# Patient Record
Sex: FEMALE
[De-identification: names, ages, dates, MRNs, and addresses within clinical notes are randomized per-mention and may not be internally consistent; named-entity substitution may affect disease eponyms.]

---

## 2017-02-08 ENCOUNTER — APPOINTMENT (OUTPATIENT)
Dept: WOUND CARE | Facility: CLINIC | Age: 82
End: 2017-02-08

## 2017-02-08 DIAGNOSIS — L02.619 CUTANEOUS ABSCESS OF UNSPECIFIED FOOT: ICD-10-CM

## 2017-02-22 ENCOUNTER — APPOINTMENT (OUTPATIENT)
Dept: WOUND CARE | Facility: CLINIC | Age: 82
End: 2017-02-22

## 2017-04-26 ENCOUNTER — APPOINTMENT (OUTPATIENT)
Dept: WOUND CARE | Facility: CLINIC | Age: 82
End: 2017-04-26

## 2017-07-12 ENCOUNTER — APPOINTMENT (OUTPATIENT)
Dept: WOUND CARE | Facility: CLINIC | Age: 82
End: 2017-07-12

## 2017-07-12 VITALS — DIASTOLIC BLOOD PRESSURE: 91 MMHG | TEMPERATURE: 98.4 F | SYSTOLIC BLOOD PRESSURE: 183 MMHG | HEART RATE: 76 BPM

## 2017-08-30 ENCOUNTER — APPOINTMENT (OUTPATIENT)
Dept: WOUND CARE | Facility: CLINIC | Age: 82
End: 2017-08-30
Payer: MEDICARE

## 2017-08-30 PROCEDURE — 99213 OFFICE O/P EST LOW 20 MIN: CPT

## 2017-10-25 ENCOUNTER — APPOINTMENT (OUTPATIENT)
Dept: WOUND CARE | Facility: CLINIC | Age: 82
End: 2017-10-25
Payer: MEDICARE

## 2017-10-25 DIAGNOSIS — S90.829A BLISTER (NONTHERMAL), UNSPECIFIED FOOT, INITIAL ENCOUNTER: ICD-10-CM

## 2017-10-25 PROCEDURE — 99213 OFFICE O/P EST LOW 20 MIN: CPT

## 2017-11-08 ENCOUNTER — APPOINTMENT (OUTPATIENT)
Dept: WOUND CARE | Facility: CLINIC | Age: 82
End: 2017-11-08
Payer: MEDICARE

## 2017-11-08 VITALS
TEMPERATURE: 97.6 F | RESPIRATION RATE: 16 BRPM | DIASTOLIC BLOOD PRESSURE: 74 MMHG | BODY MASS INDEX: 26.5 KG/M2 | WEIGHT: 135 LBS | SYSTOLIC BLOOD PRESSURE: 178 MMHG | HEART RATE: 77 BPM | HEIGHT: 60 IN

## 2017-11-08 DIAGNOSIS — L97.512 NON-PRESSURE CHRONIC ULCER OF OTHER PART OF RIGHT FOOT WITH FAT LAYER EXPOSED: ICD-10-CM

## 2017-11-08 PROCEDURE — 11042 DBRDMT SUBQ TIS 1ST 20SQCM/<: CPT

## 2017-11-21 ENCOUNTER — APPOINTMENT (OUTPATIENT)
Dept: WOUND CARE | Facility: CLINIC | Age: 82
End: 2017-11-21
Payer: MEDICARE

## 2017-11-21 PROCEDURE — 11042 DBRDMT SUBQ TIS 1ST 20SQCM/<: CPT

## 2018-01-03 ENCOUNTER — APPOINTMENT (OUTPATIENT)
Dept: WOUND CARE | Facility: CLINIC | Age: 83
End: 2018-01-03
Payer: MEDICARE

## 2018-01-03 PROCEDURE — 99213 OFFICE O/P EST LOW 20 MIN: CPT

## 2018-02-28 ENCOUNTER — APPOINTMENT (OUTPATIENT)
Dept: WOUND CARE | Facility: CLINIC | Age: 83
End: 2018-02-28
Payer: MEDICARE

## 2018-02-28 PROCEDURE — 99213 OFFICE O/P EST LOW 20 MIN: CPT

## 2018-04-25 ENCOUNTER — APPOINTMENT (OUTPATIENT)
Dept: WOUND CARE | Facility: CLINIC | Age: 83
End: 2018-04-25
Payer: MEDICARE

## 2018-04-25 DIAGNOSIS — M20.40 OTHER HAMMER TOE(S) (ACQUIRED), UNSPECIFIED FOOT: ICD-10-CM

## 2018-04-25 DIAGNOSIS — L60.0 INGROWING NAIL: ICD-10-CM

## 2018-04-25 PROCEDURE — 99213 OFFICE O/P EST LOW 20 MIN: CPT

## 2021-09-06 ENCOUNTER — INPATIENT (INPATIENT)
Facility: HOSPITAL | Age: 86
LOS: 7 days | Discharge: HOME | DRG: 300 | End: 2021-09-14
Attending: HOSPITALIST | Admitting: STUDENT IN AN ORGANIZED HEALTH CARE EDUCATION/TRAINING PROGRAM
Payer: MEDICARE

## 2021-09-06 VITALS
TEMPERATURE: 98 F | SYSTOLIC BLOOD PRESSURE: 130 MMHG | HEART RATE: 78 BPM | RESPIRATION RATE: 146 BRPM | OXYGEN SATURATION: 98 % | DIASTOLIC BLOOD PRESSURE: 60 MMHG

## 2021-09-06 DIAGNOSIS — D64.9 ANEMIA, UNSPECIFIED: ICD-10-CM

## 2021-09-06 DIAGNOSIS — S91.309A UNSPECIFIED OPEN WOUND, UNSPECIFIED FOOT, INITIAL ENCOUNTER: ICD-10-CM

## 2021-09-06 DIAGNOSIS — E78.5 HYPERLIPIDEMIA, UNSPECIFIED: ICD-10-CM

## 2021-09-06 DIAGNOSIS — L03.116 CELLULITIS OF LEFT LOWER LIMB: ICD-10-CM

## 2021-09-06 DIAGNOSIS — Z96.641 PRESENCE OF RIGHT ARTIFICIAL HIP JOINT: Chronic | ICD-10-CM

## 2021-09-06 DIAGNOSIS — R73.9 HYPERGLYCEMIA, UNSPECIFIED: ICD-10-CM

## 2021-09-06 DIAGNOSIS — I25.10 ATHEROSCLEROTIC HEART DISEASE OF NATIVE CORONARY ARTERY WITHOUT ANGINA PECTORIS: ICD-10-CM

## 2021-09-06 DIAGNOSIS — N17.9 ACUTE KIDNEY FAILURE, UNSPECIFIED: ICD-10-CM

## 2021-09-06 DIAGNOSIS — Z98.49 CATARACT EXTRACTION STATUS, UNSPECIFIED EYE: Chronic | ICD-10-CM

## 2021-09-06 DIAGNOSIS — Z29.9 ENCOUNTER FOR PROPHYLACTIC MEASURES, UNSPECIFIED: ICD-10-CM

## 2021-09-06 DIAGNOSIS — I73.9 PERIPHERAL VASCULAR DISEASE, UNSPECIFIED: ICD-10-CM

## 2021-09-06 DIAGNOSIS — E03.9 HYPOTHYROIDISM, UNSPECIFIED: ICD-10-CM

## 2021-09-06 DIAGNOSIS — Z98.890 OTHER SPECIFIED POSTPROCEDURAL STATES: Chronic | ICD-10-CM

## 2021-09-06 DIAGNOSIS — I10 ESSENTIAL (PRIMARY) HYPERTENSION: ICD-10-CM

## 2021-09-06 LAB
ALBUMIN SERPL ELPH-MCNC: 2.6 G/DL — LOW (ref 3.3–5)
ALP SERPL-CCNC: 92 U/L — SIGNIFICANT CHANGE UP (ref 40–120)
ALT FLD-CCNC: 16 U/L — SIGNIFICANT CHANGE UP (ref 12–78)
ANION GAP SERPL CALC-SCNC: 6 MMOL/L — SIGNIFICANT CHANGE UP (ref 5–17)
AST SERPL-CCNC: 14 U/L — LOW (ref 15–37)
BASOPHILS # BLD AUTO: 0.02 K/UL — SIGNIFICANT CHANGE UP (ref 0–0.2)
BASOPHILS NFR BLD AUTO: 0.2 % — SIGNIFICANT CHANGE UP (ref 0–2)
BILIRUB SERPL-MCNC: 0.2 MG/DL — SIGNIFICANT CHANGE UP (ref 0.2–1.2)
BUN SERPL-MCNC: 54 MG/DL — HIGH (ref 7–23)
CALCIUM SERPL-MCNC: 8.5 MG/DL — SIGNIFICANT CHANGE UP (ref 8.5–10.1)
CHLORIDE SERPL-SCNC: 109 MMOL/L — HIGH (ref 96–108)
CO2 SERPL-SCNC: 28 MMOL/L — SIGNIFICANT CHANGE UP (ref 22–31)
CREAT SERPL-MCNC: 1.6 MG/DL — HIGH (ref 0.5–1.3)
EOSINOPHIL # BLD AUTO: 0.25 K/UL — SIGNIFICANT CHANGE UP (ref 0–0.5)
EOSINOPHIL NFR BLD AUTO: 2.3 % — SIGNIFICANT CHANGE UP (ref 0–6)
ERYTHROCYTE [SEDIMENTATION RATE] IN BLOOD: 53 MM/HR — HIGH (ref 0–20)
GLUCOSE SERPL-MCNC: 123 MG/DL — HIGH (ref 70–99)
HCT VFR BLD CALC: 31.6 % — LOW (ref 34.5–45)
HGB BLD-MCNC: 10.1 G/DL — LOW (ref 11.5–15.5)
IMM GRANULOCYTES NFR BLD AUTO: 0.5 % — SIGNIFICANT CHANGE UP (ref 0–1.5)
LACTATE SERPL-SCNC: 1.3 MMOL/L — SIGNIFICANT CHANGE UP (ref 0.7–2)
LYMPHOCYTES # BLD AUTO: 0.91 K/UL — LOW (ref 1–3.3)
LYMPHOCYTES # BLD AUTO: 8.2 % — LOW (ref 13–44)
MCHC RBC-ENTMCNC: 28.6 PG — SIGNIFICANT CHANGE UP (ref 27–34)
MCHC RBC-ENTMCNC: 32 GM/DL — SIGNIFICANT CHANGE UP (ref 32–36)
MCV RBC AUTO: 89.5 FL — SIGNIFICANT CHANGE UP (ref 80–100)
MONOCYTES # BLD AUTO: 0.84 K/UL — SIGNIFICANT CHANGE UP (ref 0–0.9)
MONOCYTES NFR BLD AUTO: 7.6 % — SIGNIFICANT CHANGE UP (ref 2–14)
NEUTROPHILS # BLD AUTO: 9 K/UL — HIGH (ref 1.8–7.4)
NEUTROPHILS NFR BLD AUTO: 81.2 % — HIGH (ref 43–77)
NRBC # BLD: 0 /100 WBCS — SIGNIFICANT CHANGE UP (ref 0–0)
PLATELET # BLD AUTO: 261 K/UL — SIGNIFICANT CHANGE UP (ref 150–400)
POTASSIUM SERPL-MCNC: 4.5 MMOL/L — SIGNIFICANT CHANGE UP (ref 3.5–5.3)
POTASSIUM SERPL-SCNC: 4.5 MMOL/L — SIGNIFICANT CHANGE UP (ref 3.5–5.3)
PROT SERPL-MCNC: 6.8 G/DL — SIGNIFICANT CHANGE UP (ref 6–8.3)
RBC # BLD: 3.53 M/UL — LOW (ref 3.8–5.2)
RBC # FLD: 16.5 % — HIGH (ref 10.3–14.5)
SARS-COV-2 RNA SPEC QL NAA+PROBE: SIGNIFICANT CHANGE UP
SODIUM SERPL-SCNC: 143 MMOL/L — SIGNIFICANT CHANGE UP (ref 135–145)
WBC # BLD: 11.07 K/UL — HIGH (ref 3.8–10.5)
WBC # FLD AUTO: 11.07 K/UL — HIGH (ref 3.8–10.5)

## 2021-09-06 PROCEDURE — 71045 X-RAY EXAM CHEST 1 VIEW: CPT | Mod: 26

## 2021-09-06 PROCEDURE — 73630 X-RAY EXAM OF FOOT: CPT | Mod: 26,LT

## 2021-09-06 PROCEDURE — 99285 EMERGENCY DEPT VISIT HI MDM: CPT

## 2021-09-06 PROCEDURE — 93010 ELECTROCARDIOGRAM REPORT: CPT

## 2021-09-06 PROCEDURE — 99223 1ST HOSP IP/OBS HIGH 75: CPT | Mod: GC

## 2021-09-06 RX ORDER — LOSARTAN POTASSIUM 100 MG/1
100 TABLET, FILM COATED ORAL DAILY
Refills: 0 | Status: DISCONTINUED | OUTPATIENT
Start: 2021-09-06 | End: 2021-09-06

## 2021-09-06 RX ORDER — INFLUENZA VIRUS VACCINE 15; 15; 15; 15 UG/.5ML; UG/.5ML; UG/.5ML; UG/.5ML
0.5 SUSPENSION INTRAMUSCULAR ONCE
Refills: 0 | Status: COMPLETED | OUTPATIENT
Start: 2021-09-06 | End: 2021-09-06

## 2021-09-06 RX ORDER — FONDAPARINUX SODIUM 2.5 MG/.5ML
2.5 INJECTION, SOLUTION SUBCUTANEOUS DAILY
Refills: 0 | Status: DISCONTINUED | OUTPATIENT
Start: 2021-09-06 | End: 2021-09-07

## 2021-09-06 RX ORDER — LEVOTHYROXINE SODIUM 125 MCG
25 TABLET ORAL DAILY
Refills: 0 | Status: DISCONTINUED | OUTPATIENT
Start: 2021-09-06 | End: 2021-09-14

## 2021-09-06 RX ORDER — SODIUM CHLORIDE 9 MG/ML
1000 INJECTION INTRAMUSCULAR; INTRAVENOUS; SUBCUTANEOUS ONCE
Refills: 0 | Status: COMPLETED | OUTPATIENT
Start: 2021-09-06 | End: 2021-09-06

## 2021-09-06 RX ORDER — PIPERACILLIN AND TAZOBACTAM 4; .5 G/20ML; G/20ML
3.38 INJECTION, POWDER, LYOPHILIZED, FOR SOLUTION INTRAVENOUS ONCE
Refills: 0 | Status: COMPLETED | OUTPATIENT
Start: 2021-09-06 | End: 2021-09-06

## 2021-09-06 RX ORDER — SIMVASTATIN 20 MG/1
40 TABLET, FILM COATED ORAL AT BEDTIME
Refills: 0 | Status: DISCONTINUED | OUTPATIENT
Start: 2021-09-06 | End: 2021-09-14

## 2021-09-06 RX ORDER — LANOLIN ALCOHOL/MO/W.PET/CERES
3 CREAM (GRAM) TOPICAL AT BEDTIME
Refills: 0 | Status: DISCONTINUED | OUTPATIENT
Start: 2021-09-06 | End: 2021-09-14

## 2021-09-06 RX ORDER — ASPIRIN/CALCIUM CARB/MAGNESIUM 324 MG
81 TABLET ORAL DAILY
Refills: 0 | Status: DISCONTINUED | OUTPATIENT
Start: 2021-09-06 | End: 2021-09-14

## 2021-09-06 RX ORDER — CLOPIDOGREL BISULFATE 75 MG/1
75 TABLET, FILM COATED ORAL DAILY
Refills: 0 | Status: DISCONTINUED | OUTPATIENT
Start: 2021-09-06 | End: 2021-09-14

## 2021-09-06 RX ORDER — ACETAMINOPHEN 500 MG
650 TABLET ORAL EVERY 6 HOURS
Refills: 0 | Status: DISCONTINUED | OUTPATIENT
Start: 2021-09-06 | End: 2021-09-14

## 2021-09-06 RX ORDER — SENNA PLUS 8.6 MG/1
2 TABLET ORAL AT BEDTIME
Refills: 0 | Status: DISCONTINUED | OUTPATIENT
Start: 2021-09-06 | End: 2021-09-14

## 2021-09-06 RX ORDER — ATENOLOL 25 MG/1
100 TABLET ORAL DAILY
Refills: 0 | Status: DISCONTINUED | OUTPATIENT
Start: 2021-09-06 | End: 2021-09-14

## 2021-09-06 RX ORDER — PIPERACILLIN AND TAZOBACTAM 4; .5 G/20ML; G/20ML
3.38 INJECTION, POWDER, LYOPHILIZED, FOR SOLUTION INTRAVENOUS EVERY 8 HOURS
Refills: 0 | Status: DISCONTINUED | OUTPATIENT
Start: 2021-09-06 | End: 2021-09-09

## 2021-09-06 RX ADMIN — Medication 650 MILLIGRAM(S): at 20:51

## 2021-09-06 RX ADMIN — Medication 650 MILLIGRAM(S): at 20:21

## 2021-09-06 RX ADMIN — SODIUM CHLORIDE 1000 MILLILITER(S): 9 INJECTION INTRAMUSCULAR; INTRAVENOUS; SUBCUTANEOUS at 15:49

## 2021-09-06 RX ADMIN — SIMVASTATIN 40 MILLIGRAM(S): 20 TABLET, FILM COATED ORAL at 22:28

## 2021-09-06 RX ADMIN — PIPERACILLIN AND TAZOBACTAM 200 GRAM(S): 4; .5 INJECTION, POWDER, LYOPHILIZED, FOR SOLUTION INTRAVENOUS at 15:12

## 2021-09-06 RX ADMIN — PIPERACILLIN AND TAZOBACTAM 25 GRAM(S): 4; .5 INJECTION, POWDER, LYOPHILIZED, FOR SOLUTION INTRAVENOUS at 22:28

## 2021-09-06 NOTE — H&P ADULT - PROBLEM SELECTOR PLAN 7
- pt hyperglycemic in ED (glucose 123) without documented history of diabetes mellitus  - will check AM A1c - c/w home simvastatin 40mg

## 2021-09-06 NOTE — H&P ADULT - NSHPPHYSICALEXAM_GEN_ALL_CORE
T(C): 36.7 (09-06-21 @ 19:32), Max: 36.9 (09-06-21 @ 17:31)  HR: 86 (09-06-21 @ 19:32) (78 - 86)  BP: 149/81 (09-06-21 @ 19:32) (130/60 - 149/81)  RR: 18 (09-06-21 @ 19:32) (18 - 146)  SpO2: 95% (09-06-21 @ 19:32) (95% - 98%)    General: Elderly female in no acute distress, resting comfortably, well nourished  HEENT: NCAT, PERRLA, EOMI bl, moist mucous membranes   Neck: Supple, nontender, no mass  Neurology: A&Ox3, nonfocal, CN II-XII grossly intact, sensation intact  Respiratory: CTA B/L, No W/R/R  CV: RRR, +S1/S2, no murmurs, rubs or gallops  Abdominal: Soft, NT, ND +BSx4  Extremities: RLE with intact skin, Left lower extremity with scaly/crusted appearance, with erythematous dorsal foot and webspaces with broken skin, with purulent fluid in webspaces, mildly tender to palpation.  MSK: Normal ROM, no joint erythema or warmth, no joint swelling   Skin: warm, dry, normal color for race T(C): 36.7 (09-06-21 @ 19:32), Max: 36.9 (09-06-21 @ 17:31)  HR: 86 (09-06-21 @ 19:32) (78 - 86)  BP: 149/81 (09-06-21 @ 19:32) (130/60 - 149/81)  RR: 18 (09-06-21 @ 19:32) (18 - 146)  SpO2: 95% (09-06-21 @ 19:32) (95% - 98%)    General: Elderly female in no acute distress, resting comfortably, well nourished  HEENT: NCAT, PERRLA, EOMI bl, moist mucous membranes   Neck: Supple, nontender, no mass  Neurology: A&Ox3, nonfocal, CN II-XII grossly intact, sensation intact  Respiratory: CTA B/L, No W/R/R  CV: RRR, +S1/S2, no murmurs, rubs or gallops  Abdominal: Soft, NT, ND +BSx4  Extremities: RLE with intact skin, Left lower extremity with scaly/crusted appearance, with erythematous dorsal foot and webspaces with broken skin, with purulent fluid in webspaces, mildly tender to palpation. Bilateral lower extremities warm with bilateral DP and PT pulses difficult to appreciate.  MSK: Normal ROM, no joint erythema or warmth, no joint swelling   Skin: warm, dry, normal color for race T(C): 36.7 (09-06-21 @ 19:32), Max: 36.9 (09-06-21 @ 17:31)  HR: 86 (09-06-21 @ 19:32) (78 - 86)  BP: 149/81 (09-06-21 @ 19:32) (130/60 - 149/81)  RR: 18 (09-06-21 @ 19:32) (18 - 146)  SpO2: 95% (09-06-21 @ 19:32) (95% - 98%)    General: Elderly female in no acute distress, resting comfortably, well nourished  HEENT: NCAT, PERRLA, EOMI bl, moist mucous membranes   Neck: Supple, nontender, no mass  Neurology: A&Ox3, nonfocal, CN II-XII grossly intact, sensation intact  Respiratory: CTA B/L, No W/R/R  CV: RRR, +S1/S2, no murmurs, rubs or gallops  Abdominal: Soft, NT, ND +BSx4  Extremities: RLE with intact skin, Left lower extremity with scaly/crusted appearance, with erythematous dorsal foot and webspaces with broken skin, with purulent fluid in webspaces, mildly tender to palpation. Bilateral lower extremities warm with bilateral DP and PT pulses difficult to appreciate.  MSK: Normal ROM, no joint erythema or warmth, no joint swelling   Skin: warm, dry

## 2021-09-06 NOTE — ED ADULT NURSE NOTE - OBJECTIVE STATEMENT
Patient is a 89yo female sent from skilled nursing facility for wound check on worsening right foot wound  Pt A/OX, resting in stretcher comfortably. All VSS. No C/O CP/SOB/HA/N/V/D. Safety precautions maintained. No distress noted at this time. Will cont. to monitor. Patient is a 89yo female sent from skilled nursing facility for wound check on worsening left foot wound  Pt A/OX, resting in stretcher comfortably. All VSS. No C/O CP/SOB/HA/N/V/D. Safety precautions maintained. No distress noted at this time. Will cont. to monitor.

## 2021-09-06 NOTE — H&P ADULT - ATTENDING COMMENTS
89F with PMHx CAD, HTN, HLD, hypothyroidism, chronic foot wounds, from [facility], sent in for L foot wound that has failed outpatient antibiotic therapy, admitting for IV abx treatment of left foot cellulitis.    admit to medicine  continue IV zosyn for now  patient reports drainage and that her socks get soaked  check MRSA pcr of wound and culture  follow up blood cultures, monitor for fevers  MARIANA Gonzalez consulted for antibiotic guidance  Vascular Reilly consulted to evaluate PAD and poor pulses  podiatry consulted (Norm), d/w podiatry - to eval in AM  MOLST states DNR but patient denies this, and is indecisive - will f/u PC  d/w patient extensively    Agree with H&P as outlined above, edited where appropriate.

## 2021-09-06 NOTE — H&P ADULT - PROBLEM SELECTOR PLAN 1
- pt with history of foot wounds, past R heel wound required skin graft  - pt has had increased redness and "oozing" per wound care nurse at [facility], pt started on outpt course of augmentin (9/3) with worsening wound appearance  - received zosyn 3.375g in ED  - c/w Zosyn 3.375g  - will order ID consult Dr. Gonzaelz, appreciate recs  - will order podiatry consult, appreciate recs  - pt not complaining of pain, have tylenol 650mg PRN for mild pain - pt with history of foot wounds, past R heel wound required skin graft  - pt has had increased redness and "oozing" per wound care nurse at [facility], pt started on outpt course of augmentin (9/3) with worsening wound appearance  - received zosyn 3.375g in ED  - c/w Zosyn 3.375g  - will order ID consult Dr. Gonzalez, appreciate recs  - will order podiatry consult, appreciate recs  - will order vascular consult, appreciate recs  - pt not complaining of pain, have tylenol 650mg PRN for mild pain - pt with history of foot wounds, past R heel wound required skin graft  - pt has had increased redness and "oozing" per wound care nurse at [facility], pt started on outpt course of augmentin (9/3) with worsening wound appearance  - received zosyn 3.375g in ED - given outpatient choice not to cover MRSA - may have some data from OP podiatry  - c/w Zosyn 3.375g q8 for now - check mrsa pcr of wound and culture  - follow up blood cultures  - ID consult Dr. Gonzalez  - d/w podiatry (Dr. Zheng), to see in AM  - vascular consult Dr. Grover  - pt not complaining of pain, have tylenol 650mg PRN for mild pain

## 2021-09-06 NOTE — ED ADULT NURSE NOTE - NSIMPLEMENTINTERV_GEN_ALL_ED
Implemented All Fall with Harm Risk Interventions:  [place] to call system. Call bell, personal items and telephone within reach. Instruct patient to call for assistance. Room bathroom lighting operational. Non-slip footwear when patient is off stretcher. Physically safe environment: no spills, clutter or unnecessary equipment. Stretcher in lowest position, wheels locked, appropriate side rails in place. Provide visual cue, wrist band, yellow gown, etc. Monitor gait and stability. Monitor for mental status changes and reorient to person, place, and time. Review medications for side effects contributing to fall risk. Reinforce activity limits and safety measures with patient and family. Provide visual clues: red socks.

## 2021-09-06 NOTE — H&P ADULT - PROBLEM/PLAN-8
I spoke with patient's mom and let her know I reached out to Dr. Mathis to see if she heard back from insurance company after the manual review was sent in. Lu has decided to cancel tomorrows appointment until she knows what the insurance has to say.  
Per Dr. Mathis vision therapy is covered and [place] took care of notifying  the insurance company. I called Frantz's mom and she has decided to keep tomorrows appointment.   
DISPLAY PLAN FREE TEXT

## 2021-09-06 NOTE — H&P ADULT - NSHPOUTPATIENTPROVIDERS_GEN_ALL_CORE
PCP - Dr. Cervantes PCP - Dr. Cervantes  Podiatry - Dr. Smith PCP - Dr. Cervantes ([facility])  Podiatry - Dr. Smith

## 2021-09-06 NOTE — H&P ADULT - PROBLEM SELECTOR PLAN 9
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

## 2021-09-06 NOTE — ED PROVIDER NOTE - OBJECTIVE STATEMENT
89 y/o F from [facility] living sent in for worsening left foot infection on PO antibiotics x 3 days.  pt with chronic wounds and follows with Podiatry outpt.  Daughter at bedside states left foot started "oozing" 3 days ago and was placed on oral antibiotics.  Today during a dressing change by wound care nurse, wound/foot appeared worse.  pt denies fevers.

## 2021-09-06 NOTE — H&P ADULT - PROBLEM SELECTOR PLAN 10
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 PC follow up in AM.

## 2021-09-06 NOTE — H&P ADULT - NSHPLABSRESULTS_GEN_ALL_CORE
10.1   11.07 )-----------( 261      ( 06 Sep 2021 15:10 )             31.6     06 Sep 2021 15:10    143    |  109    |  54     ----------------------------<  123    4.5     |  28     |  1.60     Ca    8.5        06 Sep 2021 15:10    TPro  6.8    /  Alb  2.6    /  TBili  0.2    /  DBili  x      /  AST  14     /  ALT  16     /  AlkPhos  92     06 Sep 2021 15:10    LIVER FUNCTIONS - ( 06 Sep 2021 15:10 )  Alb: 2.6 g/dL / Pro: 6.8 g/dL / ALK PHOS: 92 U/L / ALT: 16 U/L / AST: 14 U/L / GGT: x             CAPILLARY BLOOD GLUCOSE .    < from: Xray Foot AP + Lateral + Oblique, Left (09.06.21 @ 14:48) >      EXAM:  XR FOOT COMP MIN 3 VIEWS LT                            PROCEDURE DATE:  09/06/2021          INTERPRETATION:  Ulcer left foot.    3 views left foot.    Advanced diffuse senile demineralization. Multiple hammertoe deformities. No fracture dislocation focal bone lysis or unusual periosteal reaction. Faint small vessel calcification. No soft tissue gas. Mild diffuse soft tissue edema    IMPRESSION: No acute or destructive osseous pathology. No plain film evidence of osteomyelitis.    --- Endof Report ---      CONNOR CEDILLO MD; Attending Radiologist  This document has been electronically signed. Sep  6 2021  2:54PM    < end of copied text >    Labs, Imaging and EKG all personally reviewed by the attending physician.

## 2021-09-06 NOTE — H&P ADULT - HISTORY OF PRESENT ILLNESS
This patient is a 89y Female, from [facility], with PMHx of HTN, HLD, CAD, CHF, hypothyroidism, chronic wounds, presenting with L foot wound. She has a history of chronic wounds and has been following with podiatry outpatient. She was recently started on augmentin on 9/3 x3d, with worsening wound appearance per wound care nurse.     In the ED,  VS: T 98.3, HR 78, /60, RR 14, SpO2 98% on RA  Labs: WBC 11.07, H/H 10.1/31.6, BUN 54, Cr 1.60, glucose 123  Imaging: XR L foot No acute or destructive osseous pathology. No plain film evidence of osteomyelitis.  EKG: _____________  Received: NS bolus 1L x1, zosyn 3.375g x1    COVID vaccine:  PCP:  Specialists:  Pharmacy: This patient is a 89y Female, from [facility], with PMHx of HTN, HLD, CAD, hypothyroidism, chronic wounds, presenting with L foot wound. She has a history of chronic wounds and has been following with podiatry outpatient (Dr. Smith). She was recently started on Augmentin on 9/3 x3d, with worsening wound appearance per wound care nurse. She is accompanied by her daughter Fer who contributes to history. She has been treated in the past for foot/heel wounds, but recently over the last 2-3 weeks has noticed redness between her toes that has progressed to cracking skin and "oozing"/"weeping," per the wound care nurse at [place]. 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 Since then, she has had "circulation" issues with her left leg but has minimal history of hospitalization, most recently for a fall in October 2019 for which she fractured her R hip, admitted at [facility], with no surgical intervention due to her age. She has been non-ambulatory in a wheelchair since then. She states her foot only hurts when somebody touches it and denies other pain, fever, chills, chest pain, cough, SOB, nausea, vomiting, abdominal pain. Unvaccinated for COVID but reports having COVID at [facility] in Dec 2020, received monoclonal antibody treatment per daughter, was not sent to hospital and oxygen levels were "good."     In the ED,  VS: T 98.3, HR 78, /60, RR 14, SpO2 98% on RA  Labs: WBC 11.07, H/H 10.1/31.6, BUN 54, Cr 1.60, glucose 123, ESR 53,   Imaging: XR L foot No acute or destructive osseous pathology. No plain film evidence of osteomyelitis. CXR pending read  EKG: sinus rhythm with 1st degree AV block, CO interval 344 ms, left axis deviation  Received: NS bolus 1L x1, zosyn 3.375g x1    COVID vaccine: none  PCP: Dr. Sarwat Cervantes  Specialists: Podiatry Dr. Smith This patient is a 89y Female, from [facility], with PMHx of HTN, HLD, CAD, PAD, hypothyroidism, chronic wounds, presenting with L foot wound. She has a history of chronic wounds and has been following with podiatry outpatient (Dr. Smith). She was recently started on Augmentin on 9/3 x3d, with worsening wound appearance per wound care nurse. She is accompanied by her daughter Fer who contributes to history. She has been treated in the past for foot/heel wounds, but recently over the last 2-3 weeks has noticed redness between her toes that has progressed to cracking skin and "oozing"/"weeping," per the wound care nurse at [place]. 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 Since then, she has had "circulation" issues with her left leg but has minimal history of hospitalization, most recently for a fall in October 2019 for which she fractured her R hip, admitted at [facility], with no surgical intervention due to her age. She has been non-ambulatory in a wheelchair since then. She states her foot only hurts when somebody touches it and denies other pain, fever, chills, chest pain, cough, SOB, nausea, vomiting, abdominal pain. Unvaccinated for COVID but reports having COVID at [facility] in Dec 2020, received monoclonal antibody treatment per daughter, was not sent to hospital and oxygen levels were "good." Patient otherwise feels well but notes that her socks get soaked and believes the area is draining.    In the ED,  VS: T 98.3, HR 78, /60, RR 14, SpO2 98% on RA  Labs: WBC 11.07, H/H 10.1/31.6, BUN 54, Cr 1.60, glucose 123, ESR 53,   CXR: rotated right, no acute infiltrates seen on personal read  XR L foot No acute or destructive osseous pathology. No plain film evidence of osteomyelitis.  EKG: sinus rhythm with 1st degree AV block, IA interval 344 ms, left axis deviation  Received: NS bolus 1L x1, zosyn 3.375g x1    COVID vaccine: none  PCP: Dr. Sarwat Cervantes  Specialists: Podiatry Dr. Smith

## 2021-09-06 NOTE — H&P ADULT - NSICDXPASTSURGICALHX_GEN_ALL_CORE_FT
PAST SURGICAL HISTORY:  History of total hip replacement, right ~2000    S/P cataract surgery     S/P LASIK surgery

## 2021-09-06 NOTE — H&P ADULT - PROBLEM SELECTOR PLAN 2
- pt with BUN 54 Cr 1.60, no baseline creatinine in chart  - received NS bolus 1L x1 in ED  - pt appears euvolemic on exam  - holding home Lasix 20mg, reassess AM labs and continue lasix if NIHARIKA improved Patient with poor pulses and hx of PAD/poor circulation  - continue asa, plavix and statin  - vascular consult

## 2021-09-06 NOTE — H&P ADULT - PROBLEM SELECTOR PLAN 3
- pt with H/H 10.1/31.6, MCV 89.5, no documented history of anemia, pt denies bleeding  - will order iron studies, retics, B12, folate, haptoglobin, LDH - pt with BUN 54 Cr 1.60, unknown baseline Cr but was ~1.36 8/2021  - likely mild prerenal azotemia on CKD 3; received NS bolus 1L x1 in ED - monitor renal indices  - pt appears euvolemic on exam  - holding home Lasix 20mg and ARB, reassess AM labs and resume if Cr is stable/toward baseline

## 2021-09-06 NOTE — H&P ADULT - PROBLEM SELECTOR PLAN 4
- c/w home ASA 81mg and plavix 75mg  - pt denies history of MI, stent placement - pt with H/H 10.1/31.6, MCV 89.5, no documented history of anemia, pt denies bleeding  - will order iron studies, retics, B12, folate, haptoglobin, LDH

## 2021-09-06 NOTE — H&P ADULT - NSHPSOCIALHISTORY_GEN_ALL_CORE
Former smoker, "didn't inhale", from ages [age]    Denies EtOH  Denies recreational drugs    Non-ambulatory, uses wheelchair  Requires assistance with ADLs from skilled nursing

## 2021-09-06 NOTE — H&P ADULT - PROBLEM SELECTOR PLAN 6
- c/w home simvastatin 40mg - on losartan 100mg and atenolol 100mg at home  - will continue atenolol with hold parameters  - will hold losartan until renal indices improve  - monitor renal indices

## 2021-09-06 NOTE — ED ADULT NURSE REASSESSMENT NOTE - NS ED NURSE REASSESS COMMENT FT1
Received patient resting in stretcher. A/Ox4. Daughter at bedside. Patient c/o left foot pain at this time. Erythema, swelling and wounds noted 2/2 cellulitis. IVF infusing per physician order. External catheter placed on patient. Call light in reach. NAD noted at this time. Received patient resting in stretcher. A/Ox4. Daughter at bedside. Patient c/o left foot pain at this time. Erythema, swelling and wounds noted 2/2 chronic wounds that became cellulitic. IVF infusing per physician order. External catheter placed on patient. Call light in reach. NAD noted at this time.

## 2021-09-06 NOTE — ED ADULT NURSE NOTE - CHPI ED NUR SEVERITY2
Patient stated he was supposed to get a prescription for Medrol Dose Pack, not at his pharmacy as of yet PAIN SCALE 4 OF 10.

## 2021-09-06 NOTE — ED PROVIDER NOTE - CLINICAL SUMMARY MEDICAL DECISION MAKING FREE TEXT BOX
left foot infection/wound.  Failed outpt oral antibiotics.  septic workup, r/o Osteo. IV antibiotics, wounds care

## 2021-09-06 NOTE — H&P ADULT - NSICDXPASTMEDICALHX_GEN_ALL_CORE_FT
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

## 2021-09-06 NOTE — H&P ADULT - NSHPREVIEWOFSYSTEMS_GEN_ALL_CORE
CONSTITUTIONAL: denies fever, chills, fatigue, weakness  HEENT: denies blurred vision, sore throat, +nasal congestion (chronic)  SKIN: denies new lesions, rash  CARDIOVASCULAR: denies chest pain, chest pressure, palpitations  RESPIRATORY: denies shortness of breath, sputum production  GASTROINTESTINAL: denies nausea, vomiting, diarrhea, abdominal pain  GENITOURINARY: denies dysuria, discharge  NEUROLOGICAL: denies numbness, headache, focal weakness  MUSCULOSKELETAL: denies new joint pain, muscle aches  HEMATOLOGIC: denies gross bleeding, bruising  LYMPHATICS: denies enlarged lymph nodes, extremity swelling  PSYCHIATRIC: denies recent changes in anxiety, depression  ENDOCRINOLOGIC: denies sweating, +cold intolerance, no heat intolerance CONSTITUTIONAL: denies fever, chills, fatigue, weakness  HEENT: denies blurred vision, sore throat, +nasal congestion (chronic), +[place]  SKIN: denies new lesions, rash  CARDIOVASCULAR: denies chest pain, chest pressure, palpitations  RESPIRATORY: denies shortness of breath, sputum production  GASTROINTESTINAL: denies nausea, vomiting, diarrhea, abdominal pain  GENITOURINARY: denies dysuria, discharge  NEUROLOGICAL: denies numbness, headache, focal weakness  MUSCULOSKELETAL: denies new joint pain, muscle aches  HEMATOLOGIC: denies gross bleeding, bruising  LYMPHATICS: denies enlarged lymph nodes, extremity swelling  PSYCHIATRIC: denies recent changes in anxiety, depression  ENDOCRINOLOGIC: denies sweating, +cold intolerance, no heat intolerance

## 2021-09-06 NOTE — H&P ADULT - ASSESSMENT
89F with PMHx CAD, HTN, HLD, ?CHF, hypothyroidism, chronic wounds, from [facility], sent in for L foot wound that has failed outpatient antibiotic therapy, admitting for IV abx treatment of L foot cellulitis. 89F with PMHx CAD, HTN, HLD, hypothyroidism, chronic foot wounds, from [facility], sent in for L foot wound that has failed outpatient antibiotic therapy, admitting for IV abx treatment of L foot cellulitis. 89F with PMHx CAD, HTN, HLD, hypothyroidism, chronic foot wounds, from [facility], sent in for L foot wound that has failed outpatient antibiotic therapy, admitting for IV abx treatment of left foot cellulitis.

## 2021-09-06 NOTE — H&P ADULT - PROBLEM SELECTOR PLAN 5
- c/w home meds (losartan 100mg and atenolol 100mg) with hold parameters - c/w home ASA 81mg, plavix 75mg and simvastatin  - pt denies history of MI, stent placement

## 2021-09-06 NOTE — H&P ADULT - PROBLEM SELECTOR PLAN 8
- c/w home synthroid 25mcg - pt hyperglycemic in ED (glucose 123) without documented history of diabetes mellitus  - will check AM A1c

## 2021-09-07 ENCOUNTER — TRANSCRIPTION ENCOUNTER (OUTPATIENT)
Age: 86
End: 2021-09-07

## 2021-09-07 LAB
A1C WITH ESTIMATED AVERAGE GLUCOSE RESULT: 5.7 % — HIGH (ref 4–5.6)
ALBUMIN SERPL ELPH-MCNC: 2.2 G/DL — LOW (ref 3.3–5)
ALP SERPL-CCNC: 66 U/L — SIGNIFICANT CHANGE UP (ref 40–120)
ALT FLD-CCNC: 13 U/L — SIGNIFICANT CHANGE UP (ref 12–78)
ANION GAP SERPL CALC-SCNC: 6 MMOL/L — SIGNIFICANT CHANGE UP (ref 5–17)
AST SERPL-CCNC: 14 U/L — LOW (ref 15–37)
BASOPHILS # BLD AUTO: 0.04 K/UL — SIGNIFICANT CHANGE UP (ref 0–0.2)
BASOPHILS NFR BLD AUTO: 0.3 % — SIGNIFICANT CHANGE UP (ref 0–2)
BILIRUB SERPL-MCNC: 0.4 MG/DL — SIGNIFICANT CHANGE UP (ref 0.2–1.2)
BUN SERPL-MCNC: 43 MG/DL — HIGH (ref 7–23)
CALCIUM SERPL-MCNC: 8.3 MG/DL — LOW (ref 8.5–10.1)
CHLORIDE SERPL-SCNC: 112 MMOL/L — HIGH (ref 96–108)
CO2 SERPL-SCNC: 25 MMOL/L — SIGNIFICANT CHANGE UP (ref 22–31)
COVID-19 SPIKE DOMAIN AB INTERP: POSITIVE
COVID-19 SPIKE DOMAIN ANTIBODY RESULT: >250 U/ML — HIGH
CREAT SERPL-MCNC: 1.2 MG/DL — SIGNIFICANT CHANGE UP (ref 0.5–1.3)
CRP SERPL-MCNC: 21 MG/L — HIGH
EOSINOPHIL # BLD AUTO: 0.14 K/UL — SIGNIFICANT CHANGE UP (ref 0–0.5)
EOSINOPHIL NFR BLD AUTO: 1.2 % — SIGNIFICANT CHANGE UP (ref 0–6)
ESTIMATED AVERAGE GLUCOSE: 117 MG/DL — HIGH (ref 68–114)
FERRITIN SERPL-MCNC: 82 NG/ML — SIGNIFICANT CHANGE UP (ref 15–150)
FOLATE SERPL-MCNC: >20 NG/ML — SIGNIFICANT CHANGE UP
GLUCOSE SERPL-MCNC: 77 MG/DL — SIGNIFICANT CHANGE UP (ref 70–99)
HAPTOGLOB SERPL-MCNC: 236 MG/DL — HIGH (ref 34–200)
HCT VFR BLD CALC: 28.9 % — LOW (ref 34.5–45)
HGB BLD-MCNC: 9.2 G/DL — LOW (ref 11.5–15.5)
IMM GRANULOCYTES NFR BLD AUTO: 0.5 % — SIGNIFICANT CHANGE UP (ref 0–1.5)
IRON SATN MFR SERPL: 11 % — LOW (ref 14–50)
IRON SATN MFR SERPL: 26 UG/DL — LOW (ref 30–160)
LDH SERPL L TO P-CCNC: 244 U/L — HIGH (ref 50–242)
LYMPHOCYTES # BLD AUTO: 0.78 K/UL — LOW (ref 1–3.3)
LYMPHOCYTES # BLD AUTO: 6.7 % — LOW (ref 13–44)
MAGNESIUM SERPL-MCNC: 2.3 MG/DL — SIGNIFICANT CHANGE UP (ref 1.6–2.6)
MCHC RBC-ENTMCNC: 28.2 PG — SIGNIFICANT CHANGE UP (ref 27–34)
MCHC RBC-ENTMCNC: 31.8 GM/DL — LOW (ref 32–36)
MCV RBC AUTO: 88.7 FL — SIGNIFICANT CHANGE UP (ref 80–100)
MONOCYTES # BLD AUTO: 0.75 K/UL — SIGNIFICANT CHANGE UP (ref 0–0.9)
MONOCYTES NFR BLD AUTO: 6.4 % — SIGNIFICANT CHANGE UP (ref 2–14)
MRSA PCR RESULT.: SIGNIFICANT CHANGE UP
NEUTROPHILS # BLD AUTO: 9.95 K/UL — HIGH (ref 1.8–7.4)
NEUTROPHILS NFR BLD AUTO: 84.9 % — HIGH (ref 43–77)
NRBC # BLD: 0 /100 WBCS — SIGNIFICANT CHANGE UP (ref 0–0)
PHOSPHATE SERPL-MCNC: 3 MG/DL — SIGNIFICANT CHANGE UP (ref 2.5–4.5)
PLATELET # BLD AUTO: 232 K/UL — SIGNIFICANT CHANGE UP (ref 150–400)
POTASSIUM SERPL-MCNC: 4.4 MMOL/L — SIGNIFICANT CHANGE UP (ref 3.5–5.3)
POTASSIUM SERPL-SCNC: 4.4 MMOL/L — SIGNIFICANT CHANGE UP (ref 3.5–5.3)
PROT SERPL-MCNC: 6.1 G/DL — SIGNIFICANT CHANGE UP (ref 6–8.3)
RBC # BLD: 3.26 M/UL — LOW (ref 3.8–5.2)
RBC # BLD: 3.26 M/UL — LOW (ref 3.8–5.2)
RBC # FLD: 16.2 % — HIGH (ref 10.3–14.5)
RETICS #: 28.7 K/UL — SIGNIFICANT CHANGE UP (ref 25–125)
RETICS/RBC NFR: 0.9 % — SIGNIFICANT CHANGE UP (ref 0.5–2.5)
S AUREUS DNA NOSE QL NAA+PROBE: SIGNIFICANT CHANGE UP
SARS-COV-2 IGG+IGM SERPL QL IA: >250 U/ML — HIGH
SARS-COV-2 IGG+IGM SERPL QL IA: POSITIVE
SODIUM SERPL-SCNC: 143 MMOL/L — SIGNIFICANT CHANGE UP (ref 135–145)
TIBC SERPL-MCNC: 247 UG/DL — SIGNIFICANT CHANGE UP (ref 220–430)
UIBC SERPL-MCNC: 221 UG/DL — SIGNIFICANT CHANGE UP (ref 110–370)
VIT B12 SERPL-MCNC: 1688 PG/ML — HIGH (ref 232–1245)
WBC # BLD: 11.72 K/UL — HIGH (ref 3.8–10.5)
WBC # FLD AUTO: 11.72 K/UL — HIGH (ref 3.8–10.5)

## 2021-09-07 PROCEDURE — 99497 ADVNCD CARE PLAN 30 MIN: CPT

## 2021-09-07 PROCEDURE — 99222 1ST HOSP IP/OBS MODERATE 55: CPT

## 2021-09-07 PROCEDURE — 99232 SBSQ HOSP IP/OBS MODERATE 35: CPT | Mod: GC

## 2021-09-07 PROCEDURE — 99221 1ST HOSP IP/OBS SF/LOW 40: CPT

## 2021-09-07 RX ORDER — LOSARTAN POTASSIUM 100 MG/1
100 TABLET, FILM COATED ORAL DAILY
Refills: 0 | Status: DISCONTINUED | OUTPATIENT
Start: 2021-09-07 | End: 2021-09-14

## 2021-09-07 RX ORDER — FUROSEMIDE 40 MG
20 TABLET ORAL DAILY
Refills: 0 | Status: DISCONTINUED | OUTPATIENT
Start: 2021-09-07 | End: 2021-09-14

## 2021-09-07 RX ADMIN — PIPERACILLIN AND TAZOBACTAM 25 GRAM(S): 4; .5 INJECTION, POWDER, LYOPHILIZED, FOR SOLUTION INTRAVENOUS at 21:10

## 2021-09-07 RX ADMIN — SENNA PLUS 2 TABLET(S): 8.6 TABLET ORAL at 21:15

## 2021-09-07 RX ADMIN — ATENOLOL 100 MILLIGRAM(S): 25 TABLET ORAL at 05:46

## 2021-09-07 RX ADMIN — SIMVASTATIN 40 MILLIGRAM(S): 20 TABLET, FILM COATED ORAL at 21:15

## 2021-09-07 RX ADMIN — Medication 25 MICROGRAM(S): at 05:47

## 2021-09-07 RX ADMIN — PIPERACILLIN AND TAZOBACTAM 25 GRAM(S): 4; .5 INJECTION, POWDER, LYOPHILIZED, FOR SOLUTION INTRAVENOUS at 15:30

## 2021-09-07 RX ADMIN — Medication 81 MILLIGRAM(S): at 12:37

## 2021-09-07 RX ADMIN — PIPERACILLIN AND TAZOBACTAM 25 GRAM(S): 4; .5 INJECTION, POWDER, LYOPHILIZED, FOR SOLUTION INTRAVENOUS at 05:46

## 2021-09-07 RX ADMIN — CLOPIDOGREL BISULFATE 75 MILLIGRAM(S): 75 TABLET, FILM COATED ORAL at 12:37

## 2021-09-07 NOTE — CONSULT NOTE ADULT - SUBJECTIVE AND OBJECTIVE BOX
HPI:  89 y/o F admitted at [facility] from [facility], with PMHx of HTN, HLD, CAD, PAD, hypothyroidism, chronic wounds, presenting with L foot wound. She has a history of chronic wounds and has been following with podiatry outpatient (Dr. Smith). She was recently started on Augmentin on 9/3 x3d, with worsening wound appearance per wound care nurse. She is accompanied by her daughter Fer who contributes to history. She has been treated in the past for foot/heel wounds, but recently over the last 2-3 weeks has noticed redness between her toes that has progressed to cracking skin and "oozing"/"weeping," per the wound care nurse at [place]. 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 Since then, she has had "circulation" issues with her left leg but has minimal history of hospitalization, most recently for a fall in October 2019 for which she fractured her R hip, admitted at [facility], with no surgical intervention due to her age. She has been non-ambulatory in a wheelchair since then. She states her foot only hurts when somebody touches it and denies other pain, fever, chills, chest pain, cough, SOB, nausea, vomiting, abdominal pain. Unvaccinated for COVID but reports having COVID at [facility] in Dec 2020, received monoclonal antibody treatment per daughter, was not sent to hospital and oxygen levels were "good." Patient otherwise feels well but notes that her socks get soaked and believes the area is draining.    Interval Events:  Vascular surgery consulted by podiatry to evaluate pt to r/o PVD. PT seen and examined at bedside with chronic wounds to b/l lower extremities. Left foot with erythema, and drainage. Pt states she notices her sock get soaked however in NAD. Pt denies pain to the lower extremity, weakness , numbness or tingling. PT denies chest pain, SOB, palpitations, fevers, chills , melena or hematochezia. Of note pt not vaccinated for covid.     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  History of total hip replacement, right  ~2000  S/P cataract surgery    S/P LASIK surgery      Allergies:  heparin (Other)    Home Medications:  AMOX/K CLAV  :   Aspir-Low 81 mg oral delayed release tablet: 1 tab(s) orally once a day  atenolol 100 mg oral tablet: 1 tab(s) orally once a day  clopidogrel 75 mg oral tablet: 1 tab(s) orally once a day  furosemide 20 mg oral tablet: 1 tab(s) orally once a day  levothyroxine 25 mcg (0.025 mg) oral tablet: 1 tab(s) orally once a day  LOSARTAN POT :   simvastatin 40 mg oral tablet: 1 tab(s) orally once a day (at bedtime)      Family History:  FAMILY HISTORY:  Family history of breast cancer in mother (Mother)        ROS:  Constitutional: Denies fever, fatigue or weight loss.  Skin: Denies rash.  Eyes: Denies recent vision problems or eye pain.  ENT: Denies congestion, ear pain, or sore throat.  Endocrine: Denies thyroid problems.  Cardiovascular: Denies chest pain or palpation.  Respiratory: Denies cough, shortness of breath, congestion, or wheezing.  Gastrointestinal: Denies abdominal pain, nausea, vomiting, or diarrhea.  Genitourinary: Denies dysuria.  Musculoskeletal: SEE HPI  Neurologic: Denies headache.      Physical Examination:  GENERAL: No acute distress, well-developed  EXTREMITIES: RLE with intact skin, Left lower extremity with scaly/crusted appearance, with erythematous dorsal foot and webspaces with broken skin, with purulent fluid in webspaces, mildly tender to palpation. +DP/PT pulses b/l on doppler  NEUROLOGY: A&O x 3, no focal deficits    Data:                        9.2    11.72 )-----------( 232      ( 07 Sep 2021 08:45 )             28.9     09-07    143  |  112<H>  |  43<H>  ----------------------------<  77  4.4   |  25  |  1.20    Ca    8.3<L>      07 Sep 2021 08:45  Phos  3.0     09-07  Mg     2.3     09-07    TPro  6.1  /  Alb  2.2<L>  /  TBili  0.4  /  DBili  x   /  AST  14<L>  /  ALT  13  /  AlkPhos  66  09-07      LIVER FUNCTIONS - ( 07 Sep 2021 08:45 )  Alb: 2.2 g/dL / Pro: 6.1 g/dL / ALK PHOS: 66 U/L / ALT: 13 U/L / AST: 14 U/L / GGT: x             Assessment:   89 y/o F extensive medical hx admitted with worsening left foot wound with purulent drainage and erythema,     Plan:  - f/u vascular studies  - cont local wound care per podiatry   - f/u podiatry plan  - pain control, supportive care  - following

## 2021-09-07 NOTE — DIETITIAN INITIAL EVALUATION ADULT. - CHIEF COMPLAINT
89y Female with PMH of Hypothyroidism, CAD, Hyperlipidemia, HTN. Pt admitted on 9/6 complaining of L foot wound.

## 2021-09-07 NOTE — DISCHARGE NOTE PROVIDER - CARE PROVIDER_API CALL
Dnaiel Zheng ([email or web address])  [facility]  [address] [place] [id number]  Phone: [phone]  Fax: [phone]  Follow Up Time:     Janet Gonzalez)  Infectious Disease; Internal Medicine  [address] [place] [address]  Phone: [phone]  Fax: [phone]  Follow Up Time:

## 2021-09-07 NOTE — DIETITIAN INITIAL EVALUATION ADULT. - PROBLEM SELECTOR PLAN 6
- on losartan 100mg and atenolol 100mg at home  - will continue atenolol with hold parameters  - will hold losartan until renal indices improve  - monitor renal indices

## 2021-09-07 NOTE — DIETITIAN INITIAL EVALUATION ADULT. - PROBLEM SELECTOR PLAN 8
- pt hyperglycemic in ED (glucose 123) without documented history of diabetes mellitus  - will check AM A1c

## 2021-09-07 NOTE — PROGRESS NOTE ADULT - PROBLEM SELECTOR PLAN 6
- On losartan 100mg and atenolol 100mg at home  - will continue atenolol with hold parameters  - will hold losartan until renal indices improve  - monitor renal indices - On losartan 100mg and atenolol 100mg at home  - will continue atenolol with hold parameters. Resumed losartan.  - monitor renal indices

## 2021-09-07 NOTE — SWALLOW BEDSIDE ASSESSMENT ADULT - SLP PERTINENT HISTORY OF CURRENT PROBLEM
Per charting, "89F with PMHx CAD, HTN, HLD, hypothyroidism, chronic foot wounds, from [facility], sent in for L foot wound that has failed outpatient antibiotic therapy, admitting for IV abx treatment of left foot cellulitis."

## 2021-09-07 NOTE — PROGRESS NOTE ADULT - PROBLEM SELECTOR PLAN 4
- Pt presented initially with H/H 10.1/31.6, MCV 89.5, no documented history of anemia, pt denies bleeding.  - AM labs (9/7) showed H/H 9.2/28.9, trending downwards.  - Follow up AM iron studies, retics, B12, folate, haptoglobin, LDH. - Pt presented initially with H/H 10.1/31.6, MCV 89.5, no documented history of anemia, pt denies bleeding.  - AM labs (9/7) showed H/H 9.2/28.9, trending downwards.  - Follow up AM iron studies, retics, B12, folate, haptoglobin, LDH.  - F/u routine CBC. - Pt presented initially with H/H 10.1/31.6, MCV 89.5, no documented history of anemia, pt denies bleeding.  - AM labs (9/7) showed H/H 9.2/28.9, trending downwards.  - Iron low, Haptoglobin elevated, LDH slightly elevated, Ferritin & TIBC wnl. Folate Normal, B12 elevated.  - F/u routine CBC.

## 2021-09-07 NOTE — PROGRESS NOTE ADULT - PROBLEM SELECTOR PLAN 3
- Pt presented initially with BUN 54 Cr 1.60, unknown baseline Cr but was ~1.36 8/2021.  - Likely mild prerenal azotemia on CKD 3; received NS bolus 1L x1 in ED - monitor renal indices.  - AM labs (9/7) showed BUN 43 and Cr 1.20, both trending downwards.  - Pt appears euvolemic on exam.  - Holding home Lasix 20mg and ARB, reassess AM labs and resume if Cr is stable/toward baseline? - Pt presented initially with BUN 54 Cr 1.60, unknown baseline Cr but was ~1.36 8/2021.  - Likely mild prerenal azotemia on CKD 3; received NS bolus 1L x1 in ED.  - AM labs (9/7) showed BUN 43 and Cr 1.20, both trending downwards.  - Pt appears euvolemic on exam.  - Restart home Lasix 20mg and ARB, as Cr returned to normal.  - Monitor daily BUN/Cr. - Pt presented initially with BUN 54 Cr 1.60, unknown baseline Cr but was ~1.36 8/2021.  - Likely mild prerenal azotemia on CKD 3; received NS bolus 1L x1 in ED.  - AM labs (9/7) showed BUN 43 and Cr 1.20, both trending downwards.  - Pt appears euvolemic on exam.  - Restarted home Lasix 20mg and ARB, as Cr returned to normal.  - Monitor daily BUN/Cr.

## 2021-09-07 NOTE — DISCHARGE NOTE PROVIDER - NSDCFUADDINST_GEN_ALL_CORE_FT
WOUND CARE INSTRUCTIONS   1. Cleanse wound with sterile saline solution and gently pat dry.  2. Dress wound with Aquacel Ag and dry, sterile dressing.  3. Change dressings every 2 days and monitor for any signs of infection.  4. Patient is to follow up [facility] with Dr. Irizarry or Dr. Zheng within 1 week upon discharge.

## 2021-09-07 NOTE — DIETITIAN INITIAL EVALUATION ADULT. - PROBLEM SELECTOR PLAN 1
- pt with history of foot wounds, past R heel wound required skin graft  - pt has had increased redness and "oozing" per wound care nurse at [facility], pt started on outpt course of augmentin (9/3) with worsening wound appearance  - received zosyn 3.375g in ED - given outpatient choice not to cover MRSA - may have some data from OP podiatry  - c/w Zosyn 3.375g q8 for now - check mrsa pcr of wound and culture  - follow up blood cultures  - ID consult Dr. Gonzalez  - d/w podiatry (Dr. Zheng), to see in AM  - vascular consult Dr. Grover  - pt not complaining of pain, have tylenol 650mg PRN for mild pain

## 2021-09-07 NOTE — DIETITIAN INITIAL EVALUATION ADULT. - PROBLEM SELECTOR PLAN 4
- pt with H/H 10.1/31.6, MCV 89.5, no documented history of anemia, pt denies bleeding  - will order iron studies, retics, B12, folate, haptoglobin, LDH

## 2021-09-07 NOTE — DISCHARGE NOTE PROVIDER - NSDCCPCAREPLAN_GEN_ALL_CORE_FT
PRINCIPAL DISCHARGE DIAGNOSIS  Diagnosis: Cellulitis of foot, left  Assessment and Plan of Treatment: Your were seen by Podiatrist Dr. león and ID arash and wound care was done and IV antibiotics were given  MRI was cancelled by Podiatry as no surgical procedure is planned   Complete course of oral antibiotics as prescribed and f/u with Podiatry and ID  Take medications per the list  F/u with all your doctors       PRINCIPAL DISCHARGE DIAGNOSIS  Diagnosis: Cellulitis of foot, left  Assessment and Plan of Treatment: Your were seen by Podiatrist Dr. león and ID arash and wound care was done and IV antibiotics were given  MRI was cancelled by Podiatry as no surgical procedure is planned   Complete course of oral antibiotics as prescribed and f/u with Podiatry and ID in 1 week. Please Call for appointments   Take medications per the list  Wound Care instructions added to the additional instruction section   F/u with all your doctors

## 2021-09-07 NOTE — CONSULT NOTE ADULT - SUBJECTIVE AND OBJECTIVE BOX
HPI:  This patient is a 89y Female, from [facility], with PMHx of HTN, HLD, CAD, PAD, hypothyroidism, chronic wounds, presenting with L foot wound. She has a history of chronic wounds and has been following with podiatry outpatient (Dr. Smith). She was recently started on Augmentin on 9/3 x3d, with worsening wound appearance per wound care nurse. She is accompanied by her daughter Fer who contributes to history. She has been treated in the past for foot/heel wounds, but recently over the last 2-3 weeks has noticed redness between her toes that has progressed to cracking skin and "oozing"/"weeping," per the wound care nurse at [place]. 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 Since then, she has had "circulation" issues with her left leg but has minimal history of hospitalization, most recently for a fall in October 2019 for which she fractured her R hip, admitted at [facility], with no surgical intervention due to her age. She has been non-ambulatory in a wheelchair since then. She states her foot only hurts when somebody touches it and denies other pain, fever, chills, chest pain, cough, SOB, nausea, vomiting, abdominal pain. Unvaccinated for COVID but reports having COVID at [facility] in Dec 2020, received monoclonal antibody treatment per daughter, was not sent to hospital and oxygen levels were "good." Patient otherwise feels well but notes that her socks get soaked and believes the area is draining.    In the ED,  VS: T 98.3, HR 78, /60, RR 14, SpO2 98% on RA  Labs: WBC 11.07, H/H 10.1/31.6, BUN 54, Cr 1.60, glucose 123, ESR 53,   CXR: rotated right, no acute infiltrates seen on personal read  XR L foot No acute or destructive osseous pathology. No plain film evidence of osteomyelitis.  EKG: sinus rhythm with 1st degree AV block, GA interval 344 ms, left axis deviation  Received: NS bolus 1L x1, zosyn 3.375g x1    COVID vaccine: none  PCP: Dr. Sarwat Cervantes  Specialists: Podiatry Dr. Smith (06 Sep 2021 19:12)      89y year old Female seen at [facility] 1EAS 104 D1 for ischemic changes to the left lower extremity and early gangrenous changes to the toes 1-5, particularly 2-3 on the left. The b  Denies any fever, chills, nausea, vomiting, chest pain, shortness of breath, or calf pain at this time.    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 3.375 Gram(s) IV Intermittent every 8 hours  senna 2 Tablet(s) Oral at bedtime  simvastatin 40 milliGRAM(s) Oral at bedtime    MEDICATIONS  (PRN):  acetaminophen   Tablet .. 650 milliGRAM(s) Oral every 6 hours PRN Temp greater or equal to 38C (100.4F), Mild Pain (1 - 3)  melatonin 3 milliGRAM(s) Oral at bedtime PRN Insomnia      Social History:  Former smoker, "didn't inhale", from ages [age]    Denies EtOH  Denies recreational drugs    Non-ambulatory, uses wheelchair  Requires assistance with ADLs from [facility] (06 Sep 2021 19:12)      FAMILY HISTORY:  Family history of breast cancer in mother (Mother)        Vital Signs Last 24 Hrs  T(C): 36.7 (07 Sep 2021 04:32), Max: 36.9 (06 Sep 2021 17:31)  T(F): 98 (07 Sep 2021 04:32), Max: 98.5 (06 Sep 2021 17:31)  HR: 67 (07 Sep 2021 04:32) (67 - 86)  BP: 127/64 (07 Sep 2021 04:32) (127/64 - 174/70)  BP(mean): --  RR: 18 (07 Sep 2021 04:32) (18 - 146)  SpO2: 94% (07 Sep 2021 04:32) (94% - 98%)    PHYSICAL EXAM:  Vascular: DP & PT palpable bilaterally, Capillary refill 3 seconds, Digital hair present bilaterally  Neurological: Light touch sensation intact bilaterally  Musculoskeletal: 5/5 strength in all quadrants bilaterally, AJ & STJ ROM intact  Dermatological: ---------- cm ulceration noted to the ----------, granular wound bed, no probe to bone, no periwound erythema, no fluctuance, no malodor, no proximal streaking at this time    CBC Full  -  ( 07 Sep 2021 08:45 )  WBC Count : 11.72 K/uL  RBC Count : 3.26 M/uL  Hemoglobin : 9.2 g/dL  Hematocrit : 28.9 %  Platelet Count - Automated : 232 K/uL  Mean Cell Volume : 88.7 fl  Mean Cell Hemoglobin : 28.2 pg  Mean Cell Hemoglobin Concentration : 31.8 gm/dL  Auto Neutrophil # : 9.95 K/uL  Auto Lymphocyte # : 0.78 K/uL  Auto Monocyte # : 0.75 K/uL  Auto Eosinophil # : 0.14 K/uL  Auto Basophil # : 0.04 K/uL  Auto Neutrophil % : 84.9 %  Auto Lymphocyte % : 6.7 %  Auto Monocyte % : 6.4 %  Auto Eosinophil % : 1.2 %  Auto Basophil % : 0.3 %      ----------CHEM PANEL----------            Imaging: ----------   HPI: This patient is a 89y Female, from [facility], with PMHx of HTN, HLD, CAD, PAD, hypothyroidism, chronic wounds, presenting with L foot wound. She has a history of chronic wounds and has been following with podiatry outpatient (Dr. Smith). She was recently started on Augmentin on 9/3 x3d, with worsening wound appearance per wound care nurse. She is accompanied by her daughter Fer who contributes to history. She has been treated in the past for foot/heel wounds, but recently over the last 2-3 weeks has noticed redness between her toes that has progressed to cracking skin and "oozing"/"weeping," per the wound care nurse at [place]. 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 Since then, she has had "circulation" issues with her left leg but has minimal history of hospitalization, most recently for a fall in October 2019 for which she fractured her R hip, admitted at [facility], with no surgical intervention due to her age. She has been non-ambulatory in a wheelchair since then. She states her foot only hurts when somebody touches it and denies other pain, fever, chills, chest pain, cough, SOB, nausea, vomiting, abdominal pain. Unvaccinated for COVID but reports having COVID at [facility] in Dec 2020, received monoclonal antibody treatment per daughter, was not sent to hospital and oxygen levels were "good." Patient otherwise feels well but notes that her socks get soaked and believes the area is draining.  In the ED, VS: T 98.3, HR 78, /60, RR 14, SpO2 98% on RA Labs: WBC 11.07, H/H 10.1/31.6, BUN 54, Cr 1.60, glucose 123, ESR 53,  CXR: rotated right, no acute infiltrates seen on personal read XR L foot No acute or destructive osseous pathology. No plain film evidence of osteomyelitis. EKG: sinus rhythm with 1st degree AV block, IA interval 344 ms, left axis deviation Received: NS bolus 1L x1, zosyn 3.375g x1  COVID vaccine: none PCP: Dr. Sarwat Cervantes Specialists: Podiatry Dr. Smith (06 Sep 2021 19:12)   89y year old Female seen at [facility] 1EAS 104 D1 for ischemic changes to the left lower extremity and early gangrenous changes to the toes 1-5, particularly 2-3 on the left. The patient can not recall how long she has the wounds on her wounds on her left foot but states that her daughter knows. The patient states that she has been following up at the wound care center at [facility] with Dr. Smith. The patient is unable to recall what they have been doing to her wounds there. The patient also mentions that she had  an appointment today with Dr. Smith. The patient states that her left foot and leg is extremely sensitive to the touch along the LLE. The patient is currently residing at a nursing home. Denies any fever, chills, nausea, vomiting, chest pain, shortness of breath, or calf pain at this time.  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 3.375 Gram(s) IV Intermittent every 8 hours senna 2 Tablet(s) Oral at bedtime simvastatin 40 milliGRAM(s) Oral at bedtime  MEDICATIONS  (PRN): acetaminophen   Tablet .. 650 milliGRAM(s) Oral every 6 hours PRN Temp greater or equal to 38C (100.4F), Mild Pain (1 - 3) melatonin 3 milliGRAM(s) Oral at bedtime PRN Insomnia   Social History: Former smoker, "didn't inhale", from ages [age]  Denies EtOH Denies recreational drugs  Non-ambulatory, uses wheelchair Requires assistance with ADLs from [facility] (06 Sep 2021 19:12)   FAMILY HISTORY: Family history of breast cancer in mother (Mother)    Vital Signs Last 24 Hrs T(C): 36.7 (07 Sep 2021 04:32), Max: 36.9 (06 Sep 2021 17:31) T(F): 98 (07 Sep 2021 04:32), Max: 98.5 (06 Sep 2021 17:31) HR: 67 (07 Sep 2021 04:32) (67 - 86) BP: 127/64 (07 Sep 2021 04:32) (127/64 - 174/70) BP(mean): -- RR: 18 (07 Sep 2021 04:32) (18 - 146) SpO2: 94% (07 Sep 2021 04:32) (94% - 98%)  PHYSICAL EXAM: Vascular: DP & PT nonpalpable bilaterally, Capillary refill less than 4 or 5 seconds, No Digital hair present bilaterally, Diffuse erythema along the left lower extremity , skin temperature colder than within normal limit  Neurological: Gross epicritic sensation b/l  Musculoskeletal: 5/5 strength in all quadrants bilaterally, AJ & STJ ROM intact, pain and tenderness as well as sensitivity along the left lower extremity  Dermatological:  1.Gangrene of the left 2nd-4th digits  2. Ischemic changes along the left lower extremity  3. Dry scaly skin along the dorsal aspect of the left foot  4. Thicken, dystropic, shorten dystrophic nails 1-5 b/l   CBC Full  -  ( 07 Sep 2021 08:45 ) WBC Count : 11.72 K/uL RBC Count : 3.26 M/uL Hemoglobin : 9.2 g/dL Hematocrit : 28.9 % Platelet Count - Automated : 232 K/uL Mean Cell Volume : 88.7 fl Mean Cell Hemoglobin : 28.2 pg Mean Cell Hemoglobin Concentration : 31.8 gm/dL Auto Neutrophil # : 9.95 K/uL Auto Lymphocyte # : 0.78 K/uL Auto Monocyte # : 0.75 K/uL Auto Eosinophil # : 0.14 K/uL Auto Basophil # : 0.04 K/uL Auto Neutrophil % : 84.9 % Auto Lymphocyte % : 6.7 % Auto Monocyte % : 6.4 % Auto Eosinophil % : 1.2 % Auto Basophil % : 0.3 %   ----------CHEM PANEL----------  09-07  143  |  112<H>  |  43<H> ----------------------------<  77 4.4   |  25  |  1.20  Ca    8.3<L>      07 Sep 2021 08:45 Phos  3.0     09-07 Mg     2.3     09-07  TPro  6.1  /  Alb  2.2<L>  /  TBili  0.4  /  DBili  x   /  AST  14<L>  /  ALT  13  /  AlkPhos  66  09-07     Imaging:  EXAM: XR FOOT COMP MIN 3 VIEWS LT   PROCEDURE DATE: 09/06/2021    INTERPRETATION: Ulcer left foot.  3 views left foot.  Advanced diffuse senile demineralization. Multiple hammertoe deformities. No fracture dislocation focal bone lysis or unusual periosteal reaction. Faint small vessel calcification. No soft tissue gas. Mild diffuse soft tissue edema  IMPRESSION: No acute or destructive osseous pathology. No plain film evidence of osteomyelitis.  --- End of Report ---      CONNOR CEDILLO MD; Attending Radiologist This document has been electronically signed. Sep 6 2021 2:54PM Notes

## 2021-09-07 NOTE — DIETITIAN INITIAL EVALUATION ADULT. - PROBLEM SELECTOR PLAN 2
Patient with poor pulses and hx of PAD/poor circulation  - continue asa, plavix and statin  - vascular consult

## 2021-09-07 NOTE — DIETITIAN INITIAL EVALUATION ADULT. - ORAL INTAKE PTA/DIET HISTORY
PTA per pt- lives at [place]   -Intake: good PO intake; consumes x3 meals/day  -Chewing/Swallowing: denies difficulty; reports eating "regular" consistency foods and thin liquids   -Allergies/Intolerances: NKFA  -Vitamins/Supplements: unable to report PTA per pt- lives at [place]; non-ambulatory in a wheelchair   -Intake: good PO intake; consumes x3 meals/day  -Chewing/Swallowing: denies difficulty; reports eating "regular" consistency foods and thin liquids   -Allergies/Intolerances: NKFA  -Vitamins/Supplements: unable to report

## 2021-09-07 NOTE — PROGRESS NOTE ADULT - PROBLEM SELECTOR PLAN 2
Pt with poor pulses and hx of PAD/poor circulation.  - Continue asa, plavix and statin.  - Vascular consulted. Pt with poor pulses and hx of PAD/poor circulation.  - Continue asa, plavix and statin.  - Vascular consulted, f/u vascular studies.

## 2021-09-07 NOTE — DIETITIAN INITIAL EVALUATION ADULT. - OTHER INFO
Current:   -Intake: per pt, good PO intake this AM  -GI: denies nausea/vomiting, diarrhea/constipation; fecal incontinence noted 9/6; bowel regimen ordered (Senna)     Weight Hx:  -No dosing weight noted   -Per pt, UBW: 126 pounds   -Per Current:   -Intake: per pt, good PO intake this AM  -GI: denies nausea/vomiting, diarrhea/constipation; fecal incontinence noted 9/6; bowel regimen ordered (Senna)     Anthropometrics:  -No dosing weight/height noted   -Per pt, UBW: 126 pounds   -Per bed scale: 121 pounds   -Height per pt: 4'9

## 2021-09-07 NOTE — DIETITIAN INITIAL EVALUATION ADULT. - ADD RECOMMEND
1) Continue DASH/TLC diet; liberalize diet if PO intake decreases; defer consistency to SLP and team. 2) Monitor PO intake, GI function, labs.

## 2021-09-07 NOTE — SWALLOW BEDSIDE ASSESSMENT ADULT - COMMENTS
Pt received upright in bed, awake and cooperative, on room air. Pt was agreeable to assessment. Pt followed simple commands. Pt's vocal quality/intensity and speech production was WFL. Pt reportedly she mainly consumes regular solid textures, though avoids dense meats due to incomplete dentition; pt otherwise denied h/o dysphagia. Pt denied pain pre and post assessment.    CXR 9/6: "No radiographic evidence of active chest disease." Pt's WBC is elevated, no fever.

## 2021-09-07 NOTE — CONSULT NOTE ADULT - SUBJECTIVE AND OBJECTIVE BOX
[facility]  INFECTIOUS DISEASES   [address] [place] [address]  Tel: [phone]     Fax: [phone]  =======================================================    [id number]  NITA PARMINDER     CC: Left foot infection     HPI:  89y Female, from [facility], with PMH of HTN, CAD, PAD, hypothyroidism, chronic wounds, was admitted with left foot wound. History taken from chart.   "She has a history of chronic wounds and has been following with podiatry outpatient. She was recently started on Augmentin on 9/3 x3d, with worsening wound appearance per wound care nurse. She is accompanied by her daughter Fer who contributes to history. She has been treated in the past for foot/heel wounds, but recently over the last 2-3 weeks has noticed redness between her toes that has progressed to cracking skin and "oozing"/"weeping," per the wound care nurse at [facility]. 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 Since then, she has had "circulation" issues with her left leg but has minimal history of hospitalization, most recently for a fall in October 2019 for which she fractured her R hip, admitted at [facility], with no surgical intervention due to her age. She has been non-ambulatory in a wheelchair since then. She states her foot only hurts when somebody touches it and denies other pain, fever, chills, chest pain, cough, SOB, nausea, vomiting, abdominal pain. 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 3.375 Gram(s) IV Intermittent every 8 hours     REVIEW OF SYSTEMS:  CONSTITUTIONAL:  No Fever or chills  HEENT:  No diplopia or blurred vision.  No sore throat or runny nose.  CARDIOVASCULAR:  No chest pain or SOB.  RESPIRATORY:  No cough, shortness of breath, PND or orthopnea.  GASTROINTESTINAL:  No nausea, vomiting or diarrhea.  GENITOURINARY:  No dysuria, frequency or urgency. No Blood in urine  MUSCULOSKELETAL:  no joint aches, no muscle pain  SKIN:  foot ulcer and pain   PSYCHIATRIC:  No disorder of thought or mood.  ENDOCRINE:  No heat or cold intolerance, polyuria or polydipsia.  HEMATOLOGICAL:  No easy bruising or bleeding.     Physical Exam:  Vital Signs Last 24 Hrs  T(C): 36.7 (07 Sep 2021 04:32), Max: 36.9 (06 Sep 2021 17:31)  T(F): 98 (07 Sep 2021 04:32), Max: 98.5 (06 Sep 2021 17:31)  HR: 67 (07 Sep 2021 04:32) (67 - 86)  BP: 127/64 (07 Sep 2021 04:32) (127/64 - 174/70)  RR: 18 (07 Sep 2021 04:32) (18 - 146)  SpO2: 94% (07 Sep 2021 04:32) (94% - 98%)  GEN: NAD  HEENT: normocephalic and atraumatic. EOMI. PERRL.    NECK: Supple.  No lymphadenopathy   LUNGS: Clear to auscultation.  HEART: Regular rate and rhythm   ABDOMEN: Soft, nontender, and nondistended.  Positive bowel sounds.    : No CVA tenderness  EXTREMITIES: Lower extremities with multiple crusted ulcers in lower legs, chronic skin changes, left foot with hammer toes in 2nd and 3rd toes with ischemic changes, between toes from 1 to 3rd there are some pus discharge that was cleaned   NEUROLOGIC: grossly intact.  PSYCHIATRIC: Appropriate affect .  SKIN: No rash    Labs:  09-07    143  |  112<H>  |  43<H>  ----------------------------<  77  4.4   |  25  |  1.20    Ca    8.3<L>      07 Sep 2021 08:45  Phos  3.0     09-07  Mg     2.3     09-07    TPro  6.1  /  Alb  2.2<L>  /  TBili  0.4  /  DBili  x   /  AST  14<L>  /  ALT  13  /  AlkPhos  66  09-07                        9.2    11.72 )-----------( 232      ( 07 Sep 2021 08:45 )             28.9     LIVER FUNCTIONS - ( 07 Sep 2021 08:45 )  Alb: 2.2 g/dL / Pro: 6.1 g/dL / ALK PHOS: 66 U/L / ALT: 13 U/L / AST: 14 U/L / GGT: x           C-Reactive Protein, Serum: 21 mg/L (09-07-21 @ 00:16)    Sedimentation Rate, Erythrocyte: 53 mm/hr (09-06-21 @ 15:10)    COVID-19 PCR: NotDetec (09-06-21 @ 15:15)    All imaging and other data have been reviewed.  < from: Xray Foot AP + Lateral + Oblique, Left (09.06.21 @ 14:48) >  IMPRESSION: No acute or destructive osseous pathology. No plain film evidence of osteomyelitis.      Assessment and Plan:   89y Female, from [facility], with PMH of HTN, CAD, PAD, hypothyroidism, chronic wounds, was admitted with left foot wound.   Chronic skin changes with areas of open wounds and some pus discharge between toes, possibly main problem is the ischemia, for superficial ulcer can treat for possible cellulitis,   I wouldn't be very aggressive at this time.     1- Left foot wound and infection with ischemia    Recommendations:   - Blood culture   - Wound culture sent but most likely would be contaminated with skin joseline, will decide based on results   - CRP and EDR mildly elevated  - Xray neg for OM  - Podiatry consult   - Continue zosyn for now until cultures are back     Thank you for courtesy of this consult.     Will follow.  Discussed with the primary team.     Janet Gonzalez MD  Division of Infectious Diseases   Cell [phone] between 8am and 6pm   After 6pm and weekends please call ID service at [phone].

## 2021-09-07 NOTE — PROGRESS NOTE ADULT - SUBJECTIVE AND OBJECTIVE BOX
Patient is a 89y old Female who presents with a chief complaint of L foot wound (07 Sep 2021 10:53)      INTERVAL HPI/OVERNIGHT EVENTS: Patient seen and examined at bedside. No notable overnight events. Patient complains of mild pain 5/10 in severity of left foot that improves with repositioning. Patient denies any fevers, chills, headaches, chest pain, sob, abdominal pain, n/v/d/c.     MEDICATIONS  (STANDING):  aspirin enteric coated 81 milliGRAM(s) Oral daily  ATENolol  Tablet 100 milliGRAM(s) Oral daily  clopidogrel Tablet 75 milliGRAM(s) Oral daily  influenza   Vaccine 0.5 milliLiter(s) IntraMuscular once  levothyroxine 25 MICROGram(s) Oral daily  piperacillin/tazobactam IVPB.. 3.375 Gram(s) IV Intermittent every 8 hours  senna 2 Tablet(s) Oral at bedtime  simvastatin 40 milliGRAM(s) Oral at bedtime    MEDICATIONS  (PRN):  acetaminophen   Tablet .. 650 milliGRAM(s) Oral every 6 hours PRN Temp greater or equal to 38C (100.4F), Mild Pain (1 - 3)  melatonin 3 milliGRAM(s) Oral at bedtime PRN Insomnia      Allergies    heparin (Other)    Intolerances      REVIEW OF SYSTEMS:  CONSTITUTIONAL: No fever or chills  HEENT:  No headache  RESPIRATORY: No cough, wheezing, or shortness of breath  CARDIOVASCULAR: No chest pain, palpitations  GASTROINTESTINAL: No abd pain, nausea, vomiting, diarrhea, or constipation  GENITOURINARY: No dysuria      Vital Signs Last 24 Hrs  T(C): 36.7 (07 Sep 2021 04:32), Max: 36.9 (06 Sep 2021 17:31)  T(F): 98 (07 Sep 2021 04:32), Max: 98.5 (06 Sep 2021 17:31)  HR: 67 (07 Sep 2021 04:32) (67 - 86)  BP: 127/64 (07 Sep 2021 04:32) (127/64 - 174/70)  BP(mean): --  RR: 18 (07 Sep 2021 04:32) (18 - 146)  SpO2: 94% (07 Sep 2021 04:32) (94% - 98%)      PHYSICAL EXAM:  GENERAL: NAD. Appears stated age. Temporal wasting.  HEENT: Anicteric, moist mucous membranes  CHEST/LUNG:  CTA b/l, no rales, wheezes, or rhonchi  HEART:  RRR, Normal S1, S2.  ABDOMEN:  BS+, soft, nontender, nondistended  EXTREMITIES: Ecchymoses throughout b/l upper and lower extremities. Left lower extremity has scaly/crusted appearance with erythematous dorsal foot and webspaces with broken skin. Purulent fluid in webspaces, mildy tender to palpation. Bilateral lower extremities warm with bilateral DP and PT pulses difficult to appreciate. No calf tenderness.  NERVOUS SYSTEM: Answers questions and follows commands appropriately      LABS:                        9.2    11.72 )-----------( 232      ( 07 Sep 2021 08:45 )             28.9     CBC Full  -  ( 07 Sep 2021 08:45 )  WBC Count : 11.72 K/uL  Hemoglobin : 9.2 g/dL  Hematocrit : 28.9 %  Platelet Count - Automated : 232 K/uL  Mean Cell Volume : 88.7 fl  Mean Cell Hemoglobin : 28.2 pg  Mean Cell Hemoglobin Concentration : 31.8 gm/dL  Auto Neutrophil # : 9.95 K/uL  Auto Lymphocyte # : 0.78 K/uL  Auto Monocyte # : 0.75 K/uL  Auto Eosinophil # : 0.14 K/uL  Auto Basophil # : 0.04 K/uL  Auto Neutrophil % : 84.9 %  Auto Lymphocyte % : 6.7 %  Auto Monocyte % : 6.4 %  Auto Eosinophil % : 1.2 %  Auto Basophil % : 0.3 %    143    |  112    |  43     ----------------------------<  77       ( 07 Sep 2021 08:45 )  4.4     |  25     |  1.20     Ca    8.3        07 Sep 2021 08:45  Phos  3.0       07 Sep 2021 08:45  Mg     2.3       07 Sep 2021 08:45    TPro  6.1    /  Alb  2.2    /  TBili  0.4    /  DBili  x      /  AST  14     /  ALT  13     /  AlkPhos  66     07 Sep 2021 08:45      RADIOLOGY & ADDITIONAL TESTS:    EXAM:  XR FOOT COMP MIN 3 VIEWS LT                          PROCEDURE DATE:  09/06/2021      INTERPRETATION:  Ulcer left foot.    Advanced diffuse senile demineralization. Multiple hammertoe deformities. No fracture dislocation focal bone lysis or unusual periosteal reaction. Faint small vessel calcification. No soft tissue gas. Mild diffuse soft tissue edema    IMPRESSION: No acute or destructive osseous pathology. No plain film evidence of osteomyelitis.    --- End of Report ---  CONNOR CEDILLO MD; Attending Radiologist

## 2021-09-07 NOTE — DISCHARGE NOTE PROVIDER - NSDCMRMEDTOKEN_GEN_ALL_CORE_FT
AMOX/K CLAV  :   Aspir-Low 81 mg oral delayed release tablet: 1 tab(s) orally once a day  atenolol 100 mg oral tablet: 1 tab(s) orally once a day  clopidogrel 75 mg oral tablet: 1 tab(s) orally once a day  furosemide 20 mg oral tablet: 1 tab(s) orally once a day  levothyroxine 25 mcg (0.025 mg) oral tablet: 1 tab(s) orally once a day  LOSARTAN POT :   simvastatin 40 mg oral tablet: 1 tab(s) orally once a day (at bedtime)   acetaminophen 325 mg oral tablet: 2 tab(s) orally every 6 hours, As needed, Temp greater or equal to 38C (100.4F), Mild Pain (1 - 3)  Aspir-Low 81 mg oral delayed release tablet: 1 tab(s) orally once a day  atenolol 100 mg oral tablet: 1 tab(s) orally once a day  cefpodoxime 100 mg oral tablet: 1 tab(s) orally every 12 hours  ciprofloxacin 250 mg oral tablet: 1 tab(s) orally every 12 hours  clopidogrel 75 mg oral tablet: 1 tab(s) orally once a day  furosemide 20 mg oral tablet: 1 tab(s) orally once a day  hydrALAZINE 10 mg oral tablet: 1 tab(s) orally every 8 hours  lactobacillus acidophilus oral capsule: 1 tab(s) orally 3 times a day (with meals)  levothyroxine 25 mcg (0.025 mg) oral tablet: 1 tab(s) orally once a day  losartan 100 mg oral tablet: 1 tab(s) orally once a day  simvastatin 40 mg oral tablet: 1 tab(s) orally once a day (at bedtime)

## 2021-09-07 NOTE — CONSULT NOTE ADULT - ATTENDING COMMENTS
Patient evaluated at the bedside. L foot evaluated. Non palpable pedal pulses. Extensive venous stasis disease. Patient is non ambulatory. Will obtain  non invasive vascular studies.

## 2021-09-07 NOTE — PROGRESS NOTE ADULT - PROBLEM SELECTOR PLAN 1
- Pt has had increased redness and "oozing" per wound care nurse at [facility] the past 2-3 weeks and was started on outpatient course of Augmentin on 9/3 for 3x days with worsening wound appearance.  - Pt with history of foot wounds, past R heel wound required skin graft.  - Received Zosyn 3.375g in ED - given outpatient choice not to cover MRSA - may have some data from OP podiatry.  - Continue Zosyn q8 per ID rec while awaiting cultures.  - Continue Tylenol 650mg PRN for mild pain.  - XRay of left foot was negative for osteomyelitis or any destructive osseous changes  - Follow up blood cultures.  - Follow up wound cultures.  - ID Dr. Gonzalez is following  - Podiatry (d/c with Dr. Zheng) is following.  - Vascular Dr. Grover consulted.  - d/w podiatry (Dr. Zheng), to see in AM - Pt has had increased redness and "oozing" per wound care nurse at [facility] the past 2-3 weeks and was started on outpatient course of Augmentin on 9/3 for 3x days with worsening wound appearance.  - Pt with history of foot wounds, past R heel wound required skin graft.  - Received Zosyn 3.375g in ED - given outpatient choice not to cover MRSA - may have some data from OP podiatry.  - Continue Zosyn q8 per ID rec while awaiting cultures.  - Continue Tylenol 650mg PRN for mild pain.  - XRay of left foot was negative for osteomyelitis or any destructive osseous changes  - Follow up blood cx.  - Follow up wound cx.  - ID Dr. Gonzalez is following  - Podiatry (d/c with Dr. Zheng) is following.  - Vascular Dr. Grover consulted.  - d/w podiatry (Dr. Zheng), to see in AM

## 2021-09-07 NOTE — SWALLOW BEDSIDE ASSESSMENT ADULT - SWALLOW EVAL: DIAGNOSIS
Pt self-administered PO trials of thin liquids, puree, and regular solids. Pt p/w a functional oral phase marked by adequate retrieval and containment, timely manipulation and transfer, and adequate clearance post swallow. Mastication of regular solids was mildly prolonged 2/2 incomplete dentition. Pharyngeal phase marked by suspected timely swallow onset, +laryngeal elevation to palpation, and no overt s/sx of aspiration. Recommend solid upgrade to soft solids with continuation of thin liquids +aspiration precautions. Discussed with RN and MD.

## 2021-09-07 NOTE — DISCHARGE NOTE PROVIDER - HOSPITAL COURSE
HPI:  This patient is a 89y Female, from [facility], with PMHx of HTN, HLD, CAD, PAD, hypothyroidism, chronic wounds, presenting with L foot wound. She has a history of chronic wounds and has been following with podiatry outpatient (Dr. Smith). She was recently started on Augmentin on 9/3 x3d, with worsening wound appearance per wound care nurse. She is accompanied by her daughter Fer who contributes to history. She has been treated in the past for foot/heel wounds, but recently over the last 2-3 weeks has noticed redness between her toes that has progressed to cracking skin and "oozing"/"weeping," per the wound care nurse at [place]. 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 Since then, she has had "circulation" issues with her left leg but has minimal history of hospitalization, most recently for a fall in October 2019 for which she fractured her R hip, admitted at [facility], with no surgical intervention due to her age. She has been non-ambulatory in a wheelchair since then. She states her foot only hurts when somebody touches it and denies other pain, fever, chills, chest pain, cough, SOB, nausea, vomiting, abdominal pain. Unvaccinated for COVID but reports having COVID at [facility] in Dec 2020, received monoclonal antibody treatment per daughter, was not sent to hospital and oxygen levels were "good." Patient otherwise feels well but notes that her socks get soaked and believes the area is draining.    In the ED,  VS: T 98.3, HR 78, /60, RR 14, SpO2 98% on RA  Labs: WBC 11.07, H/H 10.1/31.6, BUN 54, Cr 1.60, glucose 123, ESR 53,   CXR: rotated right, no acute infiltrates seen on personal read  XR L foot No acute or destructive osseous pathology. No plain film evidence of osteomyelitis.  EKG: sinus rhythm with 1st degree AV block, IA interval 344 ms, left axis deviation  Received: NS bolus 1L x1, zosyn 3.375g x1    COVID vaccine: none  PCP: Dr. Sarwat Cervantes  Specialists: Podiatry Dr. Smith (06 Sep 2021 19:12)      ---  HOSPITAL COURSE:   Patient was admitted from the ED on 9/7 for left foot wound that has failed outpatient antibiotic therapy and IV antibiotic treatment for suspected left foot cellulitis. She was started on IV Zosyn 3.375mg at the ED given outpatient choice not to cover MRSA and continued on the floor. MRSA pcr of wound culture was ordered and showed ______. Blood culture was ordered and showed ________. XRay of the left foot showed advanced diffuse senile demineralization, multiple hammertoe deformities, no fracture dislocation focal bone lysis or unusual periosteal reaction, faint small vessel calcification, no soft tissue gas, mild diffuse soft tissue edema. No acute or destructive osseous pathology and no plain film evidence of osteomyelitis. ID consulted for antibiotics management. Podiatry consulted for wound management. Vascular consulted.      ---  CONSULTANTS:     ---  TIME SPENT:  I, the attending physician, was physically present for the key portions of the evaluation and management (E/M) service provided. The total amount of time spent reviewing the hospital notes, laboratory values, imaging findings, assessing/counseling the patient, discussing with consultant physicians, social work, nursing staff was -- minutes    ---  Primary care provider was made aware of plan for discharge:      [  ] NO     [  ] YES   HPI:  This patient is a 89y Female, from [facility], with PMHx of HTN, HLD, CAD, PAD, hypothyroidism, chronic wounds, presenting with L foot wound. She has a history of chronic wounds and has been following with podiatry outpatient (Dr. Smith). She was recently started on Augmentin on 9/3 x3d, with worsening wound appearance per wound care nurse. She is accompanied by her daughter Fer who contributes to history. She has been treated in the past for foot/heel wounds, but recently over the last 2-3 weeks has noticed redness between her toes that has progressed to cracking skin and "oozing"/"weeping," per the wound care nurse at [place]. 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 Since then, she has had "circulation" issues with her left leg but has minimal history of hospitalization, most recently for a fall in October 2019 for which she fractured her R hip, admitted at [facility], with no surgical intervention due to her age. She has been non-ambulatory in a wheelchair since then. She states her foot only hurts when somebody touches it and denies other pain, fever, chills, chest pain, cough, SOB, nausea, vomiting, abdominal pain. Unvaccinated for COVID but reports having COVID at [facility] in Dec 2020, received monoclonal antibody treatment per daughter, was not sent to hospital and oxygen levels were "good." Patient otherwise feels well but notes that her socks get soaked and believes the area is draining.    In the ED,  VS: T 98.3, HR 78, /60, RR 14, SpO2 98% on RA  Labs: WBC 11.07, H/H 10.1/31.6, BUN 54, Cr 1.60, glucose 123, ESR 53,   CXR: rotated right, no acute infiltrates seen on personal read  XR L foot No acute or destructive osseous pathology. No plain film evidence of osteomyelitis.  EKG: sinus rhythm with 1st degree AV block, MN interval 344 ms, left axis deviation  Received: NS bolus 1L x1, zosyn 3.375g x1    COVID vaccine: none  PCP: Dr. Sarwat Cervantes  Specialists: Podiatry Dr. Smith (06 Sep 2021 19:12)      ---  HOSPITAL COURSE:   Patient was admitted from the ED on 9/7 for worsening left foot wound with ischemic changes that has failed outpatient antibiotic therapy and requires IV antibiotic treatment for superficial cellulitis. She was started on IV Zosyn 3.375mg at the ED given outpatient choice not to cover MRSA and continued on the floor. MRSA PCR of wound culture was ordered and showed ______. Blood culture was ordered and showed ________. ID Dr. Gonzalez was consulted for antibiotic management and IV Zosyn was recommended to be continued until cultures come back. Podiatry was consulted for wound management. Vascular was consulted for ischemic changes and history of PAD. XRay of the left foot showed advanced diffuse senile demineralization, multiple hammertoe deformities, faint small vessel calcification, and mild diffuse soft tissue edema. No acute or destructive osseous pathology and osteomyelitis was found. No fracture, dislocation, focal bone lysis, unusual periosteal reaction, or soft tissue gas was noted.       ---  CONSULTANTS:   Infectious disease: Dr. Janet Gonzalez  Podiatry:   Vascular:       ---  TIME SPENT:  I, the attending physician, was physically present for the key portions of the evaluation and management (E/M) service provided. The total amount of time spent reviewing the hospital notes, laboratory values, imaging findings, assessing/counseling the patient, discussing with consultant physicians, social work, nursing staff was -- minutes    ---  Primary care provider was made aware of plan for discharge:      [  ] NO     [  ] YES   HPI:  This patient is a 89y Female, from [facility], with PMHx of HTN, HLD, CAD, PAD, hypothyroidism, chronic wounds, presenting with L foot wound. She has a history of chronic wounds and has been following with podiatry outpatient (Dr. Smith). She was recently started on Augmentin on 9/3 x3d, with worsening wound appearance per wound care nurse. She is accompanied by her daughter Fer who contributes to history. She has been treated in the past for foot/heel wounds, but recently over the last 2-3 weeks has noticed redness between her toes that has progressed to cracking skin and "oozing"/"weeping," per the wound care nurse at [place]. 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 Since then, she has had "circulation" issues with her left leg but has minimal history of hospitalization, most recently for a fall in October 2019 for which she fractured her R hip, admitted at [facility], with no surgical intervention due to her age. She has been non-ambulatory in a wheelchair since then. She states her foot only hurts when somebody touches it and denies other pain, fever, chills, chest pain, cough, SOB, nausea, vomiting, abdominal pain. Unvaccinated for COVID but reports having COVID at [facility] in Dec 2020, received monoclonal antibody treatment per daughter, was not sent to hospital and oxygen levels were "good." Patient otherwise feels well but notes that her socks get soaked and believes the area is draining.    In the ED,  VS: T 98.3, HR 78, /60, RR 14, SpO2 98% on RA  Labs: WBC 11.07, H/H 10.1/31.6, BUN 54, Cr 1.60, glucose 123, ESR 53,   CXR: rotated right, no acute infiltrates seen on personal read  XR L foot No acute or destructive osseous pathology. No plain film evidence of osteomyelitis.  EKG: sinus rhythm with 1st degree AV block, VT interval 344 ms, left axis deviation  Received: NS bolus 1L x1, zosyn 3.375g x1    COVID vaccine: none  PCP: Dr. Sarwat Cervantes  Specialists: Podiatry Dr. Smith (06 Sep 2021 19:12)      ---  HOSPITAL COURSE:   Patient was admitted from the ED on 9/7 for worsening left foot wound with ischemic changes and suppurative drainage that failed outpatient antibiotic therapy and requires IV antibiotic treatment for superficial cellulitis. She was started on IV Zosyn 3.375mg at the ED given outpatient choice not to cover MRSA and continued on the floor. MRSA PCR of wound culture was ordered and showed ______. Blood culture was ordered and showed ______. ID Dr. Gonzalez was consulted for antibiotic management and IV Zosyn was recommended to be continued until cultures came back. Podiatry was consulted for wound management and they identified gangrenous changes of 2nd-4th digits in left foot. Vascular was consulted for ischemic changes and they recommended vascular studies to evaluate for PAD. Duplex ultrasound of LLE and physio of bilateral lower extremities was ordered and showed __________. XRay of the left foot showed advanced diffuse senile demineralization, multiple hammertoe deformities, faint small vessel calcification, and mild diffuse soft tissue edema. No acute or destructive osseous pathology and osteomyelitis was found. No fracture, dislocation, focal bone lysis, unusual periosteal reaction, or soft tissue gas was noted.      ---  CONSULTANTS:   Infectious disease: Dr. Janet Gonzalez  Podiatry: Dr. Zheng  Vascular: Dr. Diaz  Heme:      ---  TIME SPENT:  I, the attending physician, was physically present for the key portions of the evaluation and management (E/M) service provided. The total amount of time spent reviewing the hospital notes, laboratory values, imaging findings, assessing/counseling the patient, discussing with consultant physicians, social work, nursing staff was -- minutes    ---  Primary care provider was made aware of plan for discharge:      [  ] NO     [  ] YES   HPI:  This patient is a 89y Female, from [facility], with PMHx of HTN, HLD, CAD, PAD, hypothyroidism, chronic wounds, presenting with L foot wound. She has a history of chronic wounds and has been following with podiatry outpatient (Dr. Smith). She was recently started on Augmentin on 9/3 x3d, with worsening wound appearance per wound care nurse. She is accompanied by her daughter Fer who contributes to history. She has been treated in the past for foot/heel wounds, but recently over the last 2-3 weeks has noticed redness between her toes that has progressed to cracking skin and "oozing"/"weeping," per the wound care nurse at [place]. 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 Since then, she has had "circulation" issues with her left leg but has minimal history of hospitalization, most recently for a fall in October 2019 for which she fractured her R hip, admitted at [facility], with no surgical intervention due to her age. She has been non-ambulatory in a wheelchair since then. She states her foot only hurts when somebody touches it and denies other pain, fever, chills, chest pain, cough, SOB, nausea, vomiting, abdominal pain. Unvaccinated for COVID but reports having COVID at [facility] in Dec 2020, received monoclonal antibody treatment per daughter, was not sent to hospital and oxygen levels were "good." Patient otherwise feels well but notes that her socks get soaked and believes the area is draining.    In the ED,  VS: T 98.3, HR 78, /60, RR 14, SpO2 98% on RA  Labs: WBC 11.07, H/H 10.1/31.6, BUN 54, Cr 1.60, glucose 123, ESR 53,   CXR: rotated right, no acute infiltrates seen on personal read  XR L foot No acute or destructive osseous pathology. No plain film evidence of osteomyelitis.  EKG: sinus rhythm with 1st degree AV block, MN interval 344 ms, left axis deviation  Received: NS bolus 1L x1, zosyn 3.375g x1    COVID vaccine: none  PCP: Dr. Sarwat Cervantes  Specialists: Podiatry Dr. Smith (06 Sep 2021 19:12)      ---  HOSPITAL COURSE:   Patient was admitted from the ED on 9/7 for worsening left foot wound with ischemic changes and suppurative drainage that failed outpatient antibiotic therapy and requires IV antibiotic treatment for superficial cellulitis. She was started on IV Zosyn 3.375mg and changed to Cefepime later by ID.  Blood culture were negative.  ID Dr. Gonzalez  saw patient. Podiatry was consulted for wound management and they identified gangrenous changes of 2nd-4th digits in left foot. Vascular was consulted for ischemic changes and they recommended vascular studies to evaluate for PAD. Duplex ultrasound of LLE and physio of bilateral lower extremities was ordered and showed  extensive disease and Vascular suggested medical management. XRay of the left foot showed advanced diffuse senile demineralization, multiple hammertoe deformities, faint small vessel calcification, and mild diffuse soft tissue edema. No acute or destructive osseous pathology and osteomyelitis was found. No fracture, dislocation, focal bone lysis, unusual periosteal reaction, or soft tissue gas was noted. Podiatry suggested antibiotics and woud care as patient and daughter do not want any surgical procedures, MRI was cancelled by Podiatry after discussion with daughter. ID recommended 7 more days of oral antibiotics. Patient to f/u with Podiatry as out patient. Seen and examined the day of discharge.  VSS  GEN: NAD, Awake, Alert   HEENT: NCAT, PERRLA   Heart: S1S2+, Regular rate and rhythm   Lungs: Clear bilat, no wheezing   Abdomen: Soft, +bs   Extremities: + dressing   Skin: Warm, dry, no rash  Neuro: Awake, Alert, no focal motor/sensory deficits       ---  CONSULTANTS:   Infectious disease: Dr. Janet Gonzalez  Podiatry: Dr. Zheng  Vascular: Dr. Diaz  Heme:      ---  TIME SPENT:  I, the attending physician, was physically present for the key portions of the evaluation and management (E/M) service provided. The total amount of time spent reviewing the hospital notes, laboratory values, imaging findings, assessing/counseling the patient, discussing with consultant physicians, social work, nursing staff was 36  minutes    ---  Primary care provider was made aware of plan for discharge:      [  ] NO     [  ] YES   Normal genitalia; no lesions; no discharge

## 2021-09-07 NOTE — PROGRESS NOTE ADULT - ASSESSMENT
89F with PMHx CAD, HTN, HLD, hypothyroidism, chronic foot wounds, from [facility], sent in for L foot wound that has failed outpatient antibiotic therapy, admitting for IV abx treatment of left foot cellulitis.

## 2021-09-07 NOTE — DIETITIAN INITIAL EVALUATION ADULT. - PROBLEM SELECTOR PLAN 3
- pt with BUN 54 Cr 1.60, unknown baseline Cr but was ~1.36 8/2021  - likely mild prerenal azotemia on CKD 3; received NS bolus 1L x1 in ED - monitor renal indices  - pt appears euvolemic on exam  - holding home Lasix 20mg and ARB, reassess AM labs and resume if Cr is stable/toward baseline

## 2021-09-07 NOTE — SWALLOW BEDSIDE ASSESSMENT ADULT - SWALLOW EVAL: RECOMMENDED FEEDING/EATING TECHNIQUES
alternate food with liquid/check mouth frequently for oral residue/pocketing/maintain upright posture during/after eating for 30 mins/oral hygiene/position upright (90 degrees)/small sips/bites

## 2021-09-07 NOTE — CONSULT NOTE ADULT - PROBLEM SELECTOR RECOMMENDATION 9
- Patient seen and evaluated   - Wounds dressed with Aquacel ag and dsd  - Xrays appreciated above  - Possible MRI of the left foot to r/o OM  - Follow up with the vascular consultation and recommendations   - Podiatry team will continue to follow patient while in house  - wIll contact patient's daughter about treatment plan

## 2021-09-08 LAB
ALBUMIN SERPL ELPH-MCNC: 2.1 G/DL — LOW (ref 3.3–5)
ALP SERPL-CCNC: 53 U/L — SIGNIFICANT CHANGE UP (ref 40–120)
ALT FLD-CCNC: 15 U/L — SIGNIFICANT CHANGE UP (ref 12–78)
ANION GAP SERPL CALC-SCNC: 10 MMOL/L — SIGNIFICANT CHANGE UP (ref 5–17)
AST SERPL-CCNC: 21 U/L — SIGNIFICANT CHANGE UP (ref 15–37)
BASOPHILS # BLD AUTO: 0.05 K/UL — SIGNIFICANT CHANGE UP (ref 0–0.2)
BASOPHILS NFR BLD AUTO: 0.5 % — SIGNIFICANT CHANGE UP (ref 0–2)
BILIRUB SERPL-MCNC: 0.4 MG/DL — SIGNIFICANT CHANGE UP (ref 0.2–1.2)
BUN SERPL-MCNC: 35 MG/DL — HIGH (ref 7–23)
CALCIUM SERPL-MCNC: 7.8 MG/DL — LOW (ref 8.5–10.1)
CHLORIDE SERPL-SCNC: 108 MMOL/L — SIGNIFICANT CHANGE UP (ref 96–108)
CO2 SERPL-SCNC: 22 MMOL/L — SIGNIFICANT CHANGE UP (ref 22–31)
CREAT SERPL-MCNC: 1.2 MG/DL — SIGNIFICANT CHANGE UP (ref 0.5–1.3)
EOSINOPHIL # BLD AUTO: 0.06 K/UL — SIGNIFICANT CHANGE UP (ref 0–0.5)
EOSINOPHIL NFR BLD AUTO: 0.6 % — SIGNIFICANT CHANGE UP (ref 0–6)
GLUCOSE SERPL-MCNC: 65 MG/DL — LOW (ref 70–99)
HCT VFR BLD CALC: 26.5 % — LOW (ref 34.5–45)
HCT VFR BLD CALC: 29.3 % — LOW (ref 34.5–45)
HGB BLD-MCNC: 8.8 G/DL — LOW (ref 11.5–15.5)
HGB BLD-MCNC: 9.6 G/DL — LOW (ref 11.5–15.5)
IMM GRANULOCYTES NFR BLD AUTO: 0.5 % — SIGNIFICANT CHANGE UP (ref 0–1.5)
LYMPHOCYTES # BLD AUTO: 0.98 K/UL — LOW (ref 1–3.3)
LYMPHOCYTES # BLD AUTO: 9.1 % — LOW (ref 13–44)
MCHC RBC-ENTMCNC: 29.1 PG — SIGNIFICANT CHANGE UP (ref 27–34)
MCHC RBC-ENTMCNC: 33.2 GM/DL — SIGNIFICANT CHANGE UP (ref 32–36)
MCV RBC AUTO: 87.7 FL — SIGNIFICANT CHANGE UP (ref 80–100)
MONOCYTES # BLD AUTO: 0.79 K/UL — SIGNIFICANT CHANGE UP (ref 0–0.9)
MONOCYTES NFR BLD AUTO: 7.3 % — SIGNIFICANT CHANGE UP (ref 2–14)
NEUTROPHILS # BLD AUTO: 8.85 K/UL — HIGH (ref 1.8–7.4)
NEUTROPHILS NFR BLD AUTO: 82 % — HIGH (ref 43–77)
NRBC # BLD: 0 /100 WBCS — SIGNIFICANT CHANGE UP (ref 0–0)
PLATELET # BLD AUTO: 241 K/UL — SIGNIFICANT CHANGE UP (ref 150–400)
POTASSIUM SERPL-MCNC: 4.3 MMOL/L — SIGNIFICANT CHANGE UP (ref 3.5–5.3)
POTASSIUM SERPL-SCNC: 4.3 MMOL/L — SIGNIFICANT CHANGE UP (ref 3.5–5.3)
PROT SERPL-MCNC: 5.8 G/DL — LOW (ref 6–8.3)
RBC # BLD: 3.02 M/UL — LOW (ref 3.8–5.2)
RBC # FLD: 15.9 % — HIGH (ref 10.3–14.5)
SODIUM SERPL-SCNC: 140 MMOL/L — SIGNIFICANT CHANGE UP (ref 135–145)
WBC # BLD: 10.78 K/UL — HIGH (ref 3.8–10.5)
WBC # FLD AUTO: 10.78 K/UL — HIGH (ref 3.8–10.5)

## 2021-09-08 PROCEDURE — 99233 SBSQ HOSP IP/OBS HIGH 50: CPT

## 2021-09-08 PROCEDURE — 99232 SBSQ HOSP IP/OBS MODERATE 35: CPT

## 2021-09-08 RX ORDER — IRON SUCROSE 20 MG/ML
100 INJECTION, SOLUTION INTRAVENOUS ONCE
Refills: 0 | Status: COMPLETED | OUTPATIENT
Start: 2021-09-08 | End: 2021-09-08

## 2021-09-08 RX ORDER — IRON SUCROSE 20 MG/ML
100 INJECTION, SOLUTION INTRAVENOUS EVERY 24 HOURS
Refills: 0 | Status: COMPLETED | OUTPATIENT
Start: 2021-09-09 | End: 2021-09-10

## 2021-09-08 RX ORDER — APIXABAN 2.5 MG/1
2.5 TABLET, FILM COATED ORAL
Refills: 0 | Status: DISCONTINUED | OUTPATIENT
Start: 2021-09-08 | End: 2021-09-14

## 2021-09-08 RX ADMIN — PIPERACILLIN AND TAZOBACTAM 25 GRAM(S): 4; .5 INJECTION, POWDER, LYOPHILIZED, FOR SOLUTION INTRAVENOUS at 13:03

## 2021-09-08 RX ADMIN — SIMVASTATIN 40 MILLIGRAM(S): 20 TABLET, FILM COATED ORAL at 22:17

## 2021-09-08 RX ADMIN — Medication 25 MICROGRAM(S): at 05:59

## 2021-09-08 RX ADMIN — APIXABAN 2.5 MILLIGRAM(S): 2.5 TABLET, FILM COATED ORAL at 17:34

## 2021-09-08 RX ADMIN — ATENOLOL 100 MILLIGRAM(S): 25 TABLET ORAL at 05:59

## 2021-09-08 RX ADMIN — Medication 20 MILLIGRAM(S): at 05:59

## 2021-09-08 RX ADMIN — PIPERACILLIN AND TAZOBACTAM 25 GRAM(S): 4; .5 INJECTION, POWDER, LYOPHILIZED, FOR SOLUTION INTRAVENOUS at 05:58

## 2021-09-08 RX ADMIN — CLOPIDOGREL BISULFATE 75 MILLIGRAM(S): 75 TABLET, FILM COATED ORAL at 11:51

## 2021-09-08 RX ADMIN — PIPERACILLIN AND TAZOBACTAM 25 GRAM(S): 4; .5 INJECTION, POWDER, LYOPHILIZED, FOR SOLUTION INTRAVENOUS at 22:16

## 2021-09-08 RX ADMIN — Medication 81 MILLIGRAM(S): at 11:52

## 2021-09-08 RX ADMIN — IRON SUCROSE 100 MILLIGRAM(S): 20 INJECTION, SOLUTION INTRAVENOUS at 17:34

## 2021-09-08 RX ADMIN — LOSARTAN POTASSIUM 100 MILLIGRAM(S): 100 TABLET, FILM COATED ORAL at 05:59

## 2021-09-08 NOTE — PROGRESS NOTE ADULT - PROBLEM SELECTOR PLAN 3
- Pt presented initially with BUN 54 Cr 1.60, unknown baseline Cr but was ~1.36 8/2021.  - Likely mild prerenal azotemia on CKD 3; received NS bolus 1L x1 in ED.  - AM labs (9/8) showed BUN 35 and Cr 1.20, stable now.  - Pt appears euvolemic on exam.  - Restarted home Lasix 20mg and ARB, as Cr returned to normal 9/7.  - Monitor daily BUN/Cr.

## 2021-09-08 NOTE — PROGRESS NOTE ADULT - SUBJECTIVE AND OBJECTIVE BOX
Patient is a 89y old Female who presents with a chief complaint of L foot wound (08 Sep 2021 11:20)      INTERVAL HPI/OVERNIGHT EVENTS: Patient seen and examined at bedside. No notable overnight events. Patient reports a big improvement in pain, especially with repositioning however she did note that there is still tenderness on palpation. She had no other complaints. Patient denies any fevers, chills, headaches, chest pain, sob, abdominal pain, n/v/d/c.     MEDICATIONS  (STANDING):  aspirin enteric coated 81 milliGRAM(s) Oral daily  ATENolol  Tablet 100 milliGRAM(s) Oral daily  clopidogrel Tablet 75 milliGRAM(s) Oral daily  furosemide    Tablet 20 milliGRAM(s) Oral daily  influenza   Vaccine 0.5 milliLiter(s) IntraMuscular once  levothyroxine 25 MICROGram(s) Oral daily  losartan 100 milliGRAM(s) Oral daily  piperacillin/tazobactam IVPB.. 3.375 Gram(s) IV Intermittent every 8 hours  senna 2 Tablet(s) Oral at bedtime  simvastatin 40 milliGRAM(s) Oral at bedtime    MEDICATIONS  (PRN):  acetaminophen   Tablet .. 650 milliGRAM(s) Oral every 6 hours PRN Temp greater or equal to 38C (100.4F), Mild Pain (1 - 3)  melatonin 3 milliGRAM(s) Oral at bedtime PRN Insomnia      Allergies    heparin (Other)    Intolerances      REVIEW OF SYSTEMS:  CONSTITUTIONAL: No fever or chills  HEENT:  No headache  RESPIRATORY: No cough, wheezing, or shortness of breath  CARDIOVASCULAR: No chest pain, chest pressure, palpitations  GASTROINTESTINAL: No abd pain, nausea, vomiting, diarrhea, or constipation  GENITOURINARY: No dysuria      Vital Signs Last 24 Hrs  T(C): 36.8 (08 Sep 2021 05:24), Max: 37.1 (07 Sep 2021 21:06)  T(F): 98.2 (08 Sep 2021 05:24), Max: 98.8 (07 Sep 2021 21:06)  HR: 78 (08 Sep 2021 05:24) (70 - 86)  BP: 117/61 (08 Sep 2021 05:24) (111/78 - 154/67)  RR: 18 (08 Sep 2021 05:24) (17 - 18)  SpO2: 94% (08 Sep 2021 05:24) (93% - 95%)      PHYSICAL EXAM:  GENERAL: NAD. Appears stated age. Temporal wasting.  HEENT: Anicteric, moist mucous membranes  CHEST/LUNG:  CTA b/l, no rales, wheezes, or rhonchi  HEART:  RRR, Normal S1, S2.  ABDOMEN:  BS+, soft, nontender, nondistended  EXTREMITIES: Ecchymoses throughout b/l upper and lower extremities. Left lower extremity has scaly/crusted appearance with erythematous dorsal foot and webspaces with broken skin. Purulent fluid in webspaces, tender to palpation. Bilateral lower extremities warm with bilateral DP and PT pulses difficult to appreciate. No calf tenderness.  NERVOUS SYSTEM: Answers questions and follows commands appropriately      LABS:                      8.8    10.78 )-----------( 241      ( 08 Sep 2021 07:33 )             26.5     CBC Full  -  ( 08 Sep 2021 07:33 )  WBC Count : 10.78 K/uL  RBC Count : 3.02 M/uL  Hemoglobin : 8.8 g/dL  Hematocrit : 26.5 %  Platelet Count - Automated : 241 K/uL  Mean Cell Volume : 87.7 fl  Mean Cell Hemoglobin : 29.1 pg  Mean Cell Hemoglobin Concentration : 33.2 gm/dL  Auto Neutrophil # : 8.85 K/uL  Auto Lymphocyte # : 0.98 K/uL  Auto Monocyte # : 0.79 K/uL  Auto Eosinophil # : 0.06 K/uL  Auto Basophil # : 0.05 K/uL  Auto Neutrophil % : 82.0 %  Auto Lymphocyte % : 9.1 %  Auto Monocyte % : 7.3 %  Auto Eosinophil % : 0.6 %  Auto Basophil % : 0.5 %    140  |  108  |  35<H>  ----------------------------<  65<L>       ( 08 Sep 2021 07:33 )  4.3   |  22  |  1.20    Ca    7.8<L>      08 Sep 2021 07:33  Phos  3.0     09-07  Mg     2.3     09-07    TPro  5.8<L>  /  Alb  2.1<L>  /  TBili  0.4  /  DBili  x   /  AST  21  /  ALT  15  /  AlkPhos  53  09-08      RADIOLOGY & ADDITIONAL TESTS:    EXAM:  XR FOOT COMP MIN 3 VIEWS LT                          PROCEDURE DATE:  09/06/2021      INTERPRETATION:  Ulcer left foot.    Advanced diffuse senile demineralization. Multiple hammertoe deformities. No fracture dislocation focal bone lysis or unusual periosteal reaction. Faint small vessel calcification. No soft tissue gas. Mild diffuse soft tissue edema    IMPRESSION: No acute or destructive osseous pathology. No plain film evidence of osteomyelitis.    --- End of Report ---  CONNOR CEDILLO MD; Attending Radiologist

## 2021-09-08 NOTE — PROGRESS NOTE ADULT - ASSESSMENT
89F with PMHx CAD, HTN, HLD, hypothyroidism, chronic foot wounds, from [facility], sent in for L foot wound that has failed outpatient antibiotic therapy, admitting for IV abx treatment of left foot cellulitis. 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

## 2021-09-08 NOTE — PROGRESS NOTE ADULT - PROBLEM SELECTOR PLAN 1
Plan for vascular studies today.  Continue care per primary team  Continue Antibiotics.   Speech and swallow recs appreciated.   Will discuss with Dr. Jones.

## 2021-09-08 NOTE — PROGRESS NOTE ADULT - ASSESSMENT
89 yo female here for left foot cellulitis.  Vitals are stable.  Pt is expected to go for vascular studies today.

## 2021-09-08 NOTE — PROGRESS NOTE ADULT - ASSESSMENT
1.Gangrene of the left 2nd-4th digits   2. Ischemic changes along the left lower extremity   3. Dry scaly skin along the dorsal aspect of the left foot   4. Thicken, dystropic, shorten dystrophic nails 1-5 b/l     Clinically, patient appears to have peripheral arterial disease     Follow up with the vascular consultation and recommendations and studies     Podiatry team will continue to follow patient while in house

## 2021-09-08 NOTE — PROGRESS NOTE ADULT - SUBJECTIVE AND OBJECTIVE BOX
HPI: This patient is a 89y Female, from [facility], with PMHx of HTN, HLD, CAD, PAD, hypothyroidism, chronic wounds, presenting with L foot wound. She has a history of chronic wounds and has been following with podiatry outpatient (Dr. Smith). She was recently started on Augmentin on 9/3 x3d, with worsening wound appearance per wound care nurse. She is accompanied by her daughter Fer who contributes to history. She has been treated in the past for foot/heel wounds, but recently over the last 2-3 weeks has noticed redness between her toes that has progressed to cracking skin and "oozing"/"weeping," per the wound care nurse at [facility]. 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 Since then, she has had "circulation" issues with her left leg but has minimal history of hospitalization, most recently for a fall in October 2019 for which she fractured her R hip, admitted at [facility], with no surgical intervention due to her age. She has been non-ambulatory in a wheelchair since then. She states her foot only hurts when somebody touches it and denies other pain, fever, chills, chest pain, cough, SOB, nausea, vomiting, abdominal pain. Unvaccinated for COVID but reports having COVID at [facility] in Dec 2020, received monoclonal antibody treatment per daughter, was not sent to hospital and oxygen levels were "good." Patient otherwise feels well but notes that her socks get soaked and believes the area is draining.  In the ED, VS: T 98.3, HR 78, /60, RR 14, SpO2 98% on RA Labs: WBC 11.07, H/H 10.1/31.6, BUN 54, Cr 1.60, glucose 123, ESR 53,  CXR: rotated right, no acute infiltrates seen on personal read XR L foot No acute or destructive osseous pathology. No plain film evidence of osteomyelitis. EKG: sinus rhythm with 1st degree AV block, WA interval 344 ms, left axis deviation Received: NS bolus 1L x1, zosyn 3.375g x1  COVID vaccine: none PCP: Dr. Sarwat Cervantes Specialists: Podiatry Dr. Smith (06 Sep 2021 19:12)   89y year old Female seen at [facility] 1EAS 104 D1 for ischemic changes to the left lower extremity and early gangrenous changes to the toes 1-5, particularly 2-3 on the left. The patient can not recall how long she has the wounds on her wounds on her left foot but states that her daughter knows. The patient states that she has been following up at the wound care center at [facility] with Dr. Smith. The patient is unable to recall what they have been doing to her wounds there. The patient also mentions that she had  an appointment today with Dr. Smith. The patient states that her left foot and leg is extremely sensitive to the touch along the LLE. The patient is currently residing at a nursing home. Denies any fever, chills, nausea, vomiting, chest pain, shortness of breath, or calf pain at this time.  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 3.375 Gram(s) IV Intermittent every 8 hours senna 2 Tablet(s) Oral at bedtime simvastatin 40 milliGRAM(s) Oral at bedtime  MEDICATIONS  (PRN): acetaminophen   Tablet .. 650 milliGRAM(s) Oral every 6 hours PRN Temp greater or equal to 38C (100.4F), Mild Pain (1 - 3) melatonin 3 milliGRAM(s) Oral at bedtime PRN Insomnia   Social History: Former smoker, "didn't inhale", from ages [age]  Denies EtOH Denies recreational drugs  Non-ambulatory, uses wheelchair Requires assistance with ADLs from [facility] (06 Sep 2021 19:12)   FAMILY HISTORY: Family history of breast cancer in mother (Mother)    Vital Signs Last 24 Hrs T(C): 36.8 (08 Sep 2021 05:24), Max: 37.1 (07 Sep 2021 21:06) T(F): 98.2 (08 Sep 2021 05:24), Max: 98.8 (07 Sep 2021 21:06) HR: 78 (08 Sep 2021 05:24) (70 - 86) BP: 117/61 (08 Sep 2021 05:24) (111/78 - 154/67) BP(mean): -- RR: 18 (08 Sep 2021 05:24) (17 - 18) SpO2: 94% (08 Sep 2021 05:24) (93% - 95%)  PHYSICAL EXAM: Vascular: DP & PT nonpalpable bilaterally, Capillary refill less than 4 or 5 seconds, No Digital hair present bilaterally, Diffuse erythema along the left lower extremity , skin temperature colder than within normal limit  Neurological: Gross epicritic sensation b/l  Musculoskeletal: 5/5 strength in all quadrants bilaterally, AJ & STJ ROM intact, pain and tenderness as well as sensitivity along the left lower extremity  Dermatological:  1.Gangrene of the left 2nd-4th digits  2. Ischemic changes along the left lower extremity  3. Dry scaly skin along the dorsal aspect of the left foot  4. Thicken, dystropic, shorten dystrophic nails 1-5 b/l    CBC Full  -  ( 08 Sep 2021 07:33 ) WBC Count : 10.78 K/uL RBC Count : 3.02 M/uL Hemoglobin : 8.8 g/dL Hematocrit : 26.5 % Platelet Count - Automated : 241 K/uL Mean Cell Volume : 87.7 fl Mean Cell Hemoglobin : 29.1 pg Mean Cell Hemoglobin Concentration : 33.2 gm/dL Auto Neutrophil # : 8.85 K/uL Auto Lymphocyte # : 0.98 K/uL Auto Monocyte # : 0.79 K/uL Auto Eosinophil # : 0.06 K/uL Auto Basophil # : 0.05 K/uL Auto Neutrophil % : 82.0 % Auto Lymphocyte % : 9.1 % Auto Monocyte % : 7.3 % Auto Eosinophil % : 0.6 % Auto Basophil % : 0.5 %  ----------CHEM PANEL----------  09-08  140  |  108  |  35<H> ----------------------------<  65<L> 4.3   |  22  |  1.20  Ca    7.8<L>      08 Sep 2021 07:33 Phos  3.0     09-07 Mg     2.3     09-07  TPro  5.8<L>  /  Alb  2.1<L>  /  TBili  0.4  /  DBili  x   /  AST  21  /  ALT  15  /  AlkPhos  53  09-08      Imaging:  EXAM: XR FOOT COMP MIN 3 VIEWS LT   PROCEDURE DATE: 09/06/2021    INTERPRETATION: Ulcer left foot.  3 views left foot.  Advanced diffuse senile demineralization. Multiple hammertoe deformities. No fracture dislocation focal bone lysis or unusual periosteal reaction. Faint small vessel calcification. No soft tissue gas. Mild diffuse soft tissue edema  IMPRESSION: No acute or destructive osseous pathology. No plain film evidence of osteomyelitis.  --- End of Report ---      CONNOR CEDILLO MD; Attending Radiologist This document has been electronically signed. Sep 6 2021 2:54PM Notes

## 2021-09-08 NOTE — PROGRESS NOTE ADULT - SUBJECTIVE AND OBJECTIVE BOX
Hospital day: 2    89y Female admitted with Cellulitis of left lower extremity    Patient seen and examined bedside resting comfortably.  States she feels off.  States her foot hurts.  No other complaints.        T(F): 98.2 (09-08-21 @ 05:24), Max: 98.8 (09-07-21 @ 21:06)  HR: 78 (09-08-21 @ 05:24) (70 - 86)  BP: 117/61 (09-08-21 @ 05:24) (111/78 - 154/67)  RR: 18 (09-08-21 @ 05:24) (17 - 18)  SpO2: 94% (09-08-21 @ 05:24) (93% - 95%)  Wt(kg): --  CAPILLARY BLOOD GLUCOSE          PHYSICAL EXAM:  General: NAD  Neuro:  Alert & oriented x 3  Extremities:  Left foot:  Scaly erythematous without palpable pulses.       LABS:                        8.8    10.78 )-----------( 241      ( 08 Sep 2021 07:33 )             26.5     09-08    140  |  108  |  35<H>  ----------------------------<  65<L>  4.3   |  22  |  1.20    Ca    7.8<L>      08 Sep 2021 07:33  Phos  3.0     09-07  Mg     2.3     09-07    TPro  5.8<L>  /  Alb  2.1<L>  /  TBili  0.4  /  DBili  x   /  AST  21  /  ALT  15  /  AlkPhos  53  09-08      I&O's Detail    07 Sep 2021 07:01  -  08 Sep 2021 07:00  --------------------------------------------------------  IN:    IV PiggyBack: 100 mL    Oral Fluid: 900 mL  Total IN: 1000 mL    OUT:    Voided (mL): 450 mL  Total OUT: 450 mL    Total NET: 550 mL            RADIOLOGY:

## 2021-09-08 NOTE — CONSULT NOTE ADULT - SUBJECTIVE AND OBJECTIVE BOX
Patient is a 89y old  Female who presents with a chief complaint of L foot wound (08 Sep 2021 11:36)      HPI:  This patient is a 89y Female, from [facility], with PMHx of HTN, HLD, CAD, PAD, hypothyroidism, chronic wounds, presenting with L foot wound. She has a history of chronic wounds and has been following with podiatry outpatient (Dr. Smith). She was recently started on Augmentin on 9/3 x3d, with worsening wound appearance per wound care nurse. She is accompanied by her daughter Fer who contributes to history. She has been treated in the past for foot/heel wounds, but recently over the last 2-3 weeks has noticed redness between her toes that has progressed to cracking skin and "oozing"/"weeping," per the wound care nurse at [facility]. 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 Since then, she has had "circulation" issues with her left leg but has minimal history of hospitalization, most recently for a fall in 2019 for which she fractured her R hip, admitted at [facility], with no surgical intervention due to her age. She has been non-ambulatory in a wheelchair since then. She states her foot only hurts when somebody touches it and denies other pain, fever, chills, chest pain, cough, SOB, nausea, vomiting, abdominal pain. Unvaccinated for COVID but reports having COVID at [facility] in Dec 2020, received monoclonal antibody treatment per daughter, was not sent to hospital and oxygen levels were "good." Patient otherwise feels well but notes that her socks get soaked and believes the area is draining.    In the ED,  VS: T 98.3, HR 78, /60, RR 14, SpO2 98% on RA  Labs: WBC 11.07, H/H 10.1/31.6, BUN 54, Cr 1.60, glucose 123, ESR 53,   CXR: rotated right, no acute infiltrates seen on personal read  XR L foot No acute or destructive osseous pathology. No plain film evidence of osteomyelitis.  EKG: sinus rhythm with 1st degree AV block, NC interval 344 ms, left axis deviation  Received: NS bolus 1L x1, zosyn 3.375g x1    COVID vaccine: none  PCP: Dr. Sarwat Cervantes  Specialists: Podiatry Dr. Smith (06 Sep 2021 19:12)    Hematology asked to see pt for hx of DVT, anemia and HIT.  Granddtr at bedside.  hx of HIT and DVT post hip surgery 20yrs ago.   No heparin since. No DVT or VTE since.   Has not needed to see hematologist since.   Denies bleeding, no CP, no SOB, no LE edema.     Living in [place] since Dec 2019, over time progressively with decreased mobility.   Uses wheelchair to get around now.   Able to move legs.       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), ~2000  History of total hip replacement, right, ~2000  S/P cataract surgery  S/P LASIK surgery      HEALTH ISSUES - PROBLEM Dx:  NIHARIKA (acute kidney injury)  Wound of foot  Anemia  Hypertension  Hyperlipidemia  Need for prophylactic measure  Hyperglycemia  CAD (coronary artery disease)  Hypothyroidism  PAD (peripheral artery disease)    Cellulitis of left lower extremity [L03.116]  Hypertension [I10]  Hyperlipidemia [E78.5]  CAD (coronary artery disease) [I25.10]  Hypothyroidism [E03.9]  Wound of foot [S91.309A]  Heparin induced thrombocytopenia (HIT) [D75.82]  History of total hip replacement, right [Z96.641]  S/P cataract surgery [Z98.49]  S/P LASIK surgery [Z98.890]      FAMILY HISTORY:  Family history of breast cancer in mother (Mother)      [SOCIAL HISTORY: ]     smoking:  ex-smoker     EtOH:  denies     illicit drugs:  denies     occupation:  retired     marital status:       Other: 2 dtrs, 1 son    [ALLERGIES/INTOLERANCES:]  Allergies     heparin (Other)  Intolerances      [MEDICATIONS]  MEDICATIONS  (STANDING):  apixaban 2.5 milliGRAM(s) Oral two times a day  aspirin enteric coated 81 milliGRAM(s) Oral daily  ATENolol  Tablet 100 milliGRAM(s) Oral daily  clopidogrel Tablet 75 milliGRAM(s) Oral daily  furosemide    Tablet 20 milliGRAM(s) Oral daily  influenza   Vaccine 0.5 milliLiter(s) IntraMuscular once  levothyroxine 25 MICROGram(s) Oral daily  losartan 100 milliGRAM(s) Oral daily  piperacillin/tazobactam IVPB.. 3.375 Gram(s) IV Intermittent every 8 hours  senna 2 Tablet(s) Oral at bedtime  simvastatin 40 milliGRAM(s) Oral at bedtime    MEDICATIONS  (PRN):  acetaminophen   Tablet .. 650 milliGRAM(s) Oral every 6 hours PRN Temp greater or equal to 38C (100.4F), Mild Pain (1 - 3)  melatonin 3 milliGRAM(s) Oral at bedtime PRN Insomnia    [REVIEW OF SYSTEMS: ]  CONSTITUTIONAL: normal, no fever, no shakes, no chills   EYES: No eye pain, no visual disturbances, no discharge  ENMT:  no discharge  NECK: No pain, no stiffness  BREASTS: No pain, no masses, no nipple discharge  RESPIRATORY: No cough, no wheezing, no chills, no hemoptysis; No shortness of breath  CARDIOVASCULAR: No chest pain, no palpitations, no dizziness, no leg swelling  GASTROINTESTINAL: No abdominal, no epigastric pain. No nausea, no vomiting, no hematemesis; No diarrhea , no constipation. No melena, no hematochezia.  GENITOURINARY: No dysuria, no frequency, no hematuria, no incontinence  NEUROLOGICAL: No headaches, no memory loss, no loss of strength, no numbness, no tremors  SKIN: No itching, no burning, no rashes, no lesions   LYMPH NODES: No enlarged glands  ENDOCRINE: No heat or cold intolerance; No hair loss  MUSCULOSKELETAL: No joint pain or swelling; No muscle, no back, no extremity pain  PSYCHIATRIC: No depression, no anxiety, no mood swings, no difficulty sleeping  HEME/LYMPH: No easy bruising, no bleeding gums      [VITALS SIGNS 24hrs]  Vital Signs Last 24 Hrs  T(C): 37.6 (08 Sep 2021 13:52), Max: 37.6 (08 Sep 2021 13:52)  T(F): 99.7 (08 Sep 2021 13:52), Max: 99.7 (08 Sep 2021 13:52)  HR: 72 (08 Sep 2021 13:52) (70 - 78)  BP: 110/56 (08 Sep 2021 13:52) (110/56 - 154/67)  BP(mean): --  RR: 17 (08 Sep 2021 13:52) (17 - 18)  SpO2: 90% (08 Sep 2021 13:52) (90% - 94%)  Daily     Daily Weight in k.6 (08 Sep 2021 05:24)    I&O's Summary    07 Sep 2021 07:01  -  08 Sep 2021 07:00  --------------------------------------------------------  IN: 1000 mL / OUT: 450 mL / NET: 550 mL    08 Sep 2021 07:01  -  08 Sep 2021 19:34  --------------------------------------------------------  IN: 220 mL / OUT: 0 mL / NET: 220 mL      Last Menstrual Period      [PHYSICAL EXAM]  General: adult in NAD,  WN,  WD.  HEENT: clear oropharynx, anicteric sclera, pink conjunctivae.  Neck: supple, no masses.  CV: normal S1S2, no murmur, no rubs, no gallops.  Lungs: clear to auscultation, no wheezes, no rales, no rhonchi.  Abdomen: soft, non-tender, non-distended, no hepatosplenomegaly, normal BS, no guarding.  Ext: no clubbing, no cyanosis, no edema.  Skin: no rashes,  no petechiae, no venous stasis changes.  Neuro: alert and oriented X3, no focal motor deficits.  LN: no SC ANKIT.      [LABS: ]                        9.6    x     )-----------( x        ( 08 Sep 2021 13:14 )             29.3     CBC Full  -  ( 08 Sep 2021 13:14 )  WBC Count : x  RBC Count : x  Hemoglobin : 9.6 g/dL  Hematocrit : 29.3 %  Platelet Count - Automated : x  Mean Cell Volume : x  Mean Cell Hemoglobin : x  Mean Cell Hemoglobin Concentration : x          140  |  108  |  35<H>  ----------------------------<  65<L>  4.3   |  22  |  1.20    Ca    7.8<L>      08 Sep 2021 07:33  Phos  3.0       Mg     2.3         TPro  5.8<L>  /  Alb  2.1<L>  /  TBili  0.4  /  DBili  x   /  AST  21  /  ALT  15  /  AlkPhos  53        LIVER FUNCTIONS - ( 08 Sep 2021 07:33 )  Alb: 2.1 g/dL / Pro: 5.8 g/dL / ALK PHOS: 53 U/L / ALT: 15 U/L / AST: 21 U/L / GGT: x             CBC TREND (5 Days)  WBC Count: 10.78 K/uL ( @ 07:33)  WBC Count: 11.72 K/uL ( @ 08:45)  WBC Count: 11.07 K/uL ( @ 15:10)    Hemoglobin: 9.6 g/dL ( @ 13:14)  Hemoglobin: 8.8 g/dL (:33)  Hemoglobin: 9.2 g/dL ( @ 08:45)  Hemoglobin: 10.1 g/dL ( @ 15:10)    Hematocrit: 29.3 % ( @ 13:14)  Hematocrit: 26.5 % ( 07:33)  Hematocrit: 28.9 % ( @ 08:45)  Hematocrit: 31.6 % ( @ 15:10)    Platelet Count - Automated: 241 K/uL ( 07:33)  Platelet Count - Automated: 232 K/uL ( @ 08:45)  Platelet Count - Automated: 261 K/uL ( @ 15:10)      Anemia Studies  Reticulocyte Percent: 0.9 % ( @ 08:45)  Ferritin, Serum: 82 ng/mL ( @ 12:17)  Iron - Total Binding Capacity.: 247 ug/dL ( @ 12:18)  Vitamin B12, Serum: 1688 pg/mL ( @ 12:17)  Folate, Serum: >20.0 ng/mL ( @ 12:17)                 [MICROBIOLOGY /  VIROLOGY:]  COVID-19 PCR: NotDetec (06 Sep 2021 15:15)  Culture - Abscess with Gram Stain (collected 07 Sep 2021 15:29)  Source: .Abscess left foot  Preliminary Report (08 Sep 2021 14:28):    Numerous Enterobacter cloacae complex    Numerous Pseudomonas aeruginosa    Numerous Proteus mirabilis    Normal skin joseline isolated    Culture - Blood (collected 07 Sep 2021 01:45)  Source: .Blood Blood  Preliminary Report (08 Sep 2021 02:02):    No growth to date.    Culture - Blood (collected 07 Sep 2021 01:43)  Source: .Blood Blood  Preliminary Report (08 Sep 2021 02:02):    No growth to date.      [PATHOLOGY]         [RADIOLOGY & ADDITIONAL STUDIES:]     < from: Xray Foot AP + Lateral + Oblique, Left (21 @ 14:48) >  EXAM:  XR FOOT COMP MIN 3 VIEWS LT                        PROCEDURE DATE:  2021    INTERPRETATION:  Ulcer left foot.  3 views left foot.  Advanced diffuse senile demineralization. Multiple hammertoe deformities. No fracture dislocation focal bone lysis or unusual periosteal reaction. Faint small vessel calcification. No soft tissue gas. Mild diffuse soft tissue edema  IMPRESSION: No acute or destructive osseous pathology. No plain film evidence of osteomyelitis.  --- Endof Report ---  CONNOR CEDILLO MD; Attending Radiologist  This document has been electronically signed. Sep  6 2021  2:54PM  < end of copied text >

## 2021-09-08 NOTE — PROGRESS NOTE ADULT - NSPROGADDITIONALINFOA_GEN_ALL_CORE
- Of note, MOLST form in chart states DNR, but patient states that she knows her  signed one for himself but she is unsure of her wishes for resuscitation - does not confirm her DNR status at this time.  - Palliative care consulted and spoke with patient about goals of care yesterday 9/7.   - Health proxy is daughter Flora Hackett.  - After discussion with PC, pt deferred to proxy for decision and DNR was consented.

## 2021-09-08 NOTE — CONSULT NOTE ADULT - CONSULT REASON
Left foot infection
left foot wound
Ischemic changes to the left lower extremity
Hx of DVT or arterial thrombosis  Hx of HIT

## 2021-09-08 NOTE — PROGRESS NOTE ADULT - PROBLEM SELECTOR PLAN 6
- On losartan 100mg and atenolol 100mg at home  - Will continue atenolol with hold parameters. Resumed losartan.  - Monitor renal indices

## 2021-09-08 NOTE — PROGRESS NOTE ADULT - PROBLEM SELECTOR PLAN 1
- Patient seen and evaluated   - Wounds dressed with Aquacel ag and dsd  - Xrays appreciated above  - Possible MRI of the left foot to r/o OM  - Follow up with the vascular consultation and recommendations   - Podiatry team will continue to follow patient while in house  - wIll contact patient's daughter about treatment plan.

## 2021-09-08 NOTE — PROGRESS NOTE ADULT - PROBLEM SELECTOR PLAN 2
Pt with poor pulses and hx of PAD/poor circulation.  - Continue asa, plavix and statin.  - Pending duplex ultrasound of LLE and physio of b/l lower extremities to evaluate PAD, per Vascular.  - F/u vascular studies.  - Vascular Dr. Grover is following. Pt with poor pulses and hx of PAD/poor circulation.  - Continue asa, plavix and statin.  - Pending duplex ultrasound of LLE and physio of b/l lower extremities to evaluate PAD, per Vascular.  - F/u vascular studies.  - Vascular following.

## 2021-09-08 NOTE — PROGRESS NOTE ADULT - PROBLEM SELECTOR PLAN 10
- Pt has history of HIT (heparin-induced thrombocytopenia) in past  - Will need fondaparinux for chemical DVT ppx once cleared by podiatry and heme  - SCDs in the interim  - Heme consulted, recs appreciated. - Pt has history of HIT (heparin-induced thrombocytopenia) in past  - Will need fondaparinux for chemical DVT ppx once cleared by podiatry and heme  - SCDs in the interim  - Heme consulted, f/u recs. - Pt has history of HIT (heparin-induced thrombocytopenia) in past  - 2.5 mg BID of Eliquis while inpatient  - SCDs in the interim  - Heme consulted, f/u recs.

## 2021-09-08 NOTE — PROGRESS NOTE ADULT - PROBLEM SELECTOR PLAN 1
- Pt has had increased redness and "oozing" per wound care nurse at [facility] the past 2-3 weeks and was started on outpatient course of Augmentin on 9/3 for 3x days with worsening wound appearance.  - Pt with history of foot wounds, past R heel wound required skin graft.  - Received Zosyn 3.375g in ED - given outpatient choice not to cover MRSA - may have some data from OP podiatry.  - Continue Zosyn q8 per ID rec while awaiting cultures.  - Continue Tylenol 650mg PRN for mild pain.  - XRay of left foot was negative for osteomyelitis or any destructive osseous changes.  - MRI may be considered, per Podiatry rec.  - F/u blood cx.  - F/u wound cx.  - ID Dr. Gonzalez is following  - Podiatry (d/w with Dr. Zheng) is following.  - Vascular Dr. Grover is following. - Pt has had increased redness and "oozing" per wound care nurse at [facility] the past 2-3 weeks and was started on outpatient course of Augmentin on 9/3 for 3x days with worsening wound appearance.  - Pt with history of foot wounds, past R heel wound required skin graft.  - Received Zosyn in ED - given outpatient choice not to cover MRSA - may have some data from OP podiatry.  - Continue Zosyn q8 per ID rec while awaiting cultures.  - Continue Tylenol 650mg PRN for mild pain.  - XRay of left foot was negative for osteomyelitis or any destructive osseous changes.  - MRI may be considered, per Podiatry rec.  - F/u blood cx.  - F/u wound cx.  - ID Dr. Gonzalez is following  - Podiatry (d/w with Dr. Zheng) is following.  - Vascular Dr. Grover following.

## 2021-09-08 NOTE — PROGRESS NOTE ADULT - PROBLEM SELECTOR PLAN 4
- Pt presented initially with H/H 10.1/31.6, MCV 89.5, no documented history of anemia, pt denies bleeding.  - AM labs showed H/H downtrending 9.2/28.9 (9/7) -> 8.8/26.5 (9/8 today).  - Iron low, Haptoglobin elevated, LDH slightly elevated, Ferritin & TIBC wnl. Folate Normal, B12 elevated.  - F/u routine CBC.  - Heme consulted. - Pt presented initially with H/H 10.1/31.6, MCV 89.5, no documented history of anemia, pt denies bleeding.  - AM labs showed H/H downtrending 9.2/28.9 (9/7) -> 8.8/26.5 (9/8 today).  - Iron low, Haptoglobin elevated, LDH slightly elevated, Ferritin & TIBC wnl. Folate Normal, B12 elevated.  - Iron started by Dr. Delatorre.  - F/u routine CBC.  - Heme consulted.

## 2021-09-08 NOTE — CONSULT NOTE ADULT - ASSESSMENT
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   No recurrent VTE since.   R hip fx Oct 2019, [facility], no surgery done given age, but possible   in MI[facility] and wheelchair since Dec 2019    Now with L foot infection.     Anemia due to chronic disease  Fe def anemia. ? cause, ? due to chronic bleeding.       on DAPT with ASA and Plavix.       B12, folate adequate    RECOMMENDATIONS  Routine DVT prophylaxis per usual for pt dx cellulitis.   Encourage activity.     If anticoagulation warranted, eg for DVT prophyalxis, DOAC can be given. Eg Eliquis 2.5mg BID.     If full dose AC needed, for new DVT unrelated to RAZA, then usual dose tx for DOAC with loading reasonable.   Use of Arixtra also reasonable based on recent meta-analysis  [Am J Hematol. 2021;96(7):805. Epub 2021 May 3. ]    Heparin and LMWH contraindicated.     On ASA and plavix per cardiology    Start IV iron    Outpt GI eval if stable.   Inpt GI eval if bleeding.     DVT Prophylaxis  as above    Vascular surgery following.   Imaging or testing planned.       Thank you for consulting us.     I have discussed the above plan of care with patient/family in detail. They expressed understanding of the treatment plan . Risks, benefits and alternatives discussed in detail. I have asked if they have any questions or concerns and appropriately addressed them; all questions answered to their satisfactions and in lay terms.     Discussed with Dr Gonzales.     Thank you for consulting us.   No additional recommendations at current time.   Will sign off on case for now.   Please call, or re-consult if needed.   
1.Gangrene of the left 2nd-4th digits   2. Ischemic changes along the left lower extremity   3. Dry scaly skin along the dorsal aspect of the left foot   4. Thicken, dystropic, shorten dystrophic nails 1-5 b/l     Clinically, patient appears to have peripheral arterial disease     Follow up with the vascular consultation and recommendations and studies     Podiatry team will continue to follow patient while in house

## 2021-09-08 NOTE — PROGRESS NOTE ADULT - PROBLEM SELECTOR PLAN 8
Patient states she is taking b12 supplement 250-300 mg daily   - Pt hyperglycemic in ED (glucose 123) without documented history of diabetes mellitus  - A1c was 5.7

## 2021-09-09 LAB
-  AMIKACIN: SIGNIFICANT CHANGE UP
-  AMIKACIN: SIGNIFICANT CHANGE UP
-  AMOXICILLIN/CLAVULANIC ACID: SIGNIFICANT CHANGE UP
-  AMPICILLIN/SULBACTAM: SIGNIFICANT CHANGE UP
-  AMPICILLIN: SIGNIFICANT CHANGE UP
-  AZTREONAM: SIGNIFICANT CHANGE UP
-  AZTREONAM: SIGNIFICANT CHANGE UP
-  CEFAZOLIN: SIGNIFICANT CHANGE UP
-  CEFEPIME: SIGNIFICANT CHANGE UP
-  CEFEPIME: SIGNIFICANT CHANGE UP
-  CEFOXITIN: SIGNIFICANT CHANGE UP
-  CEFTAZIDIME: SIGNIFICANT CHANGE UP
-  CEFTRIAXONE: SIGNIFICANT CHANGE UP
-  CIPROFLOXACIN: SIGNIFICANT CHANGE UP
-  CIPROFLOXACIN: SIGNIFICANT CHANGE UP
-  ERTAPENEM: SIGNIFICANT CHANGE UP
-  GENTAMICIN: SIGNIFICANT CHANGE UP
-  GENTAMICIN: SIGNIFICANT CHANGE UP
-  IMIPENEM: SIGNIFICANT CHANGE UP
-  LEVOFLOXACIN: SIGNIFICANT CHANGE UP
-  LEVOFLOXACIN: SIGNIFICANT CHANGE UP
-  MEROPENEM: SIGNIFICANT CHANGE UP
-  MEROPENEM: SIGNIFICANT CHANGE UP
-  PIPERACILLIN/TAZOBACTAM: SIGNIFICANT CHANGE UP
-  PIPERACILLIN/TAZOBACTAM: SIGNIFICANT CHANGE UP
-  TOBRAMYCIN: SIGNIFICANT CHANGE UP
-  TOBRAMYCIN: SIGNIFICANT CHANGE UP
-  TRIMETHOPRIM/SULFAMETHOXAZOLE: SIGNIFICANT CHANGE UP
ALBUMIN SERPL ELPH-MCNC: 1.9 G/DL — LOW (ref 3.3–5)
ALP SERPL-CCNC: 57 U/L — SIGNIFICANT CHANGE UP (ref 40–120)
ALT FLD-CCNC: 17 U/L — SIGNIFICANT CHANGE UP (ref 12–78)
ANION GAP SERPL CALC-SCNC: 9 MMOL/L — SIGNIFICANT CHANGE UP (ref 5–17)
AST SERPL-CCNC: 22 U/L — SIGNIFICANT CHANGE UP (ref 15–37)
BILIRUB SERPL-MCNC: 0.3 MG/DL — SIGNIFICANT CHANGE UP (ref 0.2–1.2)
BUN SERPL-MCNC: 35 MG/DL — HIGH (ref 7–23)
CALCIUM SERPL-MCNC: 8 MG/DL — LOW (ref 8.5–10.1)
CHLORIDE SERPL-SCNC: 107 MMOL/L — SIGNIFICANT CHANGE UP (ref 96–108)
CO2 SERPL-SCNC: 25 MMOL/L — SIGNIFICANT CHANGE UP (ref 22–31)
CREAT SERPL-MCNC: 1.2 MG/DL — SIGNIFICANT CHANGE UP (ref 0.5–1.3)
GLUCOSE SERPL-MCNC: 121 MG/DL — HIGH (ref 70–99)
HCT VFR BLD CALC: 27.9 % — LOW (ref 34.5–45)
HGB BLD-MCNC: 9.3 G/DL — LOW (ref 11.5–15.5)
MCHC RBC-ENTMCNC: 29.1 PG — SIGNIFICANT CHANGE UP (ref 27–34)
MCHC RBC-ENTMCNC: 33.3 GM/DL — SIGNIFICANT CHANGE UP (ref 32–36)
MCV RBC AUTO: 87.2 FL — SIGNIFICANT CHANGE UP (ref 80–100)
METHOD TYPE: SIGNIFICANT CHANGE UP
METHOD TYPE: SIGNIFICANT CHANGE UP
NRBC # BLD: 0 /100 WBCS — SIGNIFICANT CHANGE UP (ref 0–0)
PLATELET # BLD AUTO: 253 K/UL — SIGNIFICANT CHANGE UP (ref 150–400)
POTASSIUM SERPL-MCNC: 3.8 MMOL/L — SIGNIFICANT CHANGE UP (ref 3.5–5.3)
POTASSIUM SERPL-SCNC: 3.8 MMOL/L — SIGNIFICANT CHANGE UP (ref 3.5–5.3)
PROT SERPL-MCNC: 5.8 G/DL — LOW (ref 6–8.3)
RBC # BLD: 3.2 M/UL — LOW (ref 3.8–5.2)
RBC # FLD: 15.6 % — HIGH (ref 10.3–14.5)
SODIUM SERPL-SCNC: 141 MMOL/L — SIGNIFICANT CHANGE UP (ref 135–145)
WBC # BLD: 13.1 K/UL — HIGH (ref 3.8–10.5)
WBC # FLD AUTO: 13.1 K/UL — HIGH (ref 3.8–10.5)

## 2021-09-09 PROCEDURE — 93923 UPR/LXTR ART STDY 3+ LVLS: CPT | Mod: 26

## 2021-09-09 PROCEDURE — 99233 SBSQ HOSP IP/OBS HIGH 50: CPT

## 2021-09-09 PROCEDURE — 99232 SBSQ HOSP IP/OBS MODERATE 35: CPT

## 2021-09-09 PROCEDURE — 93926 LOWER EXTREMITY STUDY: CPT | Mod: 26,LT

## 2021-09-09 RX ORDER — HYDRALAZINE HCL 50 MG
10 TABLET ORAL EVERY 8 HOURS
Refills: 0 | Status: DISCONTINUED | OUTPATIENT
Start: 2021-09-09 | End: 2021-09-14

## 2021-09-09 RX ORDER — CEFEPIME 1 G/1
2000 INJECTION, POWDER, FOR SOLUTION INTRAMUSCULAR; INTRAVENOUS EVERY 12 HOURS
Refills: 0 | Status: DISCONTINUED | OUTPATIENT
Start: 2021-09-09 | End: 2021-09-14

## 2021-09-09 RX ADMIN — PIPERACILLIN AND TAZOBACTAM 25 GRAM(S): 4; .5 INJECTION, POWDER, LYOPHILIZED, FOR SOLUTION INTRAVENOUS at 14:23

## 2021-09-09 RX ADMIN — CLOPIDOGREL BISULFATE 75 MILLIGRAM(S): 75 TABLET, FILM COATED ORAL at 12:03

## 2021-09-09 RX ADMIN — Medication 3 MILLIGRAM(S): at 21:27

## 2021-09-09 RX ADMIN — SIMVASTATIN 40 MILLIGRAM(S): 20 TABLET, FILM COATED ORAL at 21:27

## 2021-09-09 RX ADMIN — Medication 25 MICROGRAM(S): at 05:51

## 2021-09-09 RX ADMIN — Medication 650 MILLIGRAM(S): at 22:01

## 2021-09-09 RX ADMIN — Medication 10 MILLIGRAM(S): at 15:43

## 2021-09-09 RX ADMIN — ATENOLOL 100 MILLIGRAM(S): 25 TABLET ORAL at 05:51

## 2021-09-09 RX ADMIN — IRON SUCROSE 100 MILLIGRAM(S): 20 INJECTION, SOLUTION INTRAVENOUS at 15:43

## 2021-09-09 RX ADMIN — APIXABAN 2.5 MILLIGRAM(S): 2.5 TABLET, FILM COATED ORAL at 17:04

## 2021-09-09 RX ADMIN — Medication 20 MILLIGRAM(S): at 05:51

## 2021-09-09 RX ADMIN — CEFEPIME 100 MILLIGRAM(S): 1 INJECTION, POWDER, FOR SOLUTION INTRAMUSCULAR; INTRAVENOUS at 17:04

## 2021-09-09 RX ADMIN — PIPERACILLIN AND TAZOBACTAM 25 GRAM(S): 4; .5 INJECTION, POWDER, LYOPHILIZED, FOR SOLUTION INTRAVENOUS at 05:50

## 2021-09-09 RX ADMIN — APIXABAN 2.5 MILLIGRAM(S): 2.5 TABLET, FILM COATED ORAL at 05:51

## 2021-09-09 RX ADMIN — Medication 81 MILLIGRAM(S): at 12:03

## 2021-09-09 RX ADMIN — Medication 10 MILLIGRAM(S): at 21:27

## 2021-09-09 RX ADMIN — LOSARTAN POTASSIUM 100 MILLIGRAM(S): 100 TABLET, FILM COATED ORAL at 05:51

## 2021-09-09 RX ADMIN — Medication 650 MILLIGRAM(S): at 21:31

## 2021-09-09 RX ADMIN — SENNA PLUS 2 TABLET(S): 8.6 TABLET ORAL at 21:27

## 2021-09-09 NOTE — PROGRESS NOTE ADULT - PROBLEM SELECTOR PLAN 4
- Pt presented initially with H/H 10.1/31.6, MCV 89.5, no documented history of anemia, pt denies bleeding.  - AM labs showed H/H stabilizing 9.2/28.9 (9/7) -> 8.8/26.5 (9/8) -> 9.3/27.9 (9/9 today)  - Iron low, Haptoglobin elevated, LDH slightly elevated, Ferritin & TIBC wnl. Folate Normal, B12 elevated.  - Iron started by Dr. Delatorre.  - F/u routine CBC.  - Heme consulted, f/u recs.

## 2021-09-09 NOTE — PROGRESS NOTE ADULT - PROBLEM SELECTOR PLAN 8
- Pt hyperglycemic in ED (glucose 123) without documented history of diabetes mellitus  - A1c was 5.7

## 2021-09-09 NOTE — PROGRESS NOTE ADULT - ASSESSMENT
89F with PMHx CAD, HTN, HLD, hypothyroidism, chronic foot wounds, from [facility], sent in for L foot wound that has failed outpatient antibiotic therapy, admitting for IV abx treatment of left foot cellulitis, diabetic foot ulcer with gangrene of the left 2nd-4th digits and concern for OM.

## 2021-09-09 NOTE — PROGRESS NOTE ADULT - SUBJECTIVE AND OBJECTIVE BOX
Patient seen and examined bedside resting comfortably. No complaints offered.   no overnight events   T(F): 99.5 (09-09-21 @ 04:44), Max: 99.7 (09-08-21 @ 13:52)  HR: 73 (09-09-21 @ 04:44) (72 - 73)  BP: 154/63 (09-09-21 @ 04:44) (110/56 - 154/63)  RR: 17 (09-09-21 @ 04:44) (17 - 17)  SpO2: 91% (09-09-21 @ 04:44) (90% - 92%)  Wt(kg): --  CAPILLARY BLOOD GLUCOSE          PHYSICAL EXAM:  General: NAD, WDWN.   Neuro:  Alert & oriented x 3  CV: +S1+S2 regular rate and rhythm  Lung: clear to ausculation bilaterally, respirations nonlabored, good inspiratory effort  Abdomen: soft, NTND. Normative BS  Extremities: no palpable pulses, scaly skin, scaly dried eschar of the digits distally to mid metatarsal, left heal ulcer       LABS:                        9.6    x     )-----------( x        ( 08 Sep 2021 13:14 )             29.3     09-08    140  |  108  |  35<H>  ----------------------------<  65<L>  4.3   |  22  |  1.20    Ca    7.8<L>      08 Sep 2021 07:33    TPro  5.8<L>  /  Alb  2.1<L>  /  TBili  0.4  /  DBili  x   /  AST  21  /  ALT  15  /  AlkPhos  53  09-08      I&O's Detail    08 Sep 2021 07:01  -  09 Sep 2021 07:00  --------------------------------------------------------  IN:    IV PiggyBack: 300 mL    Oral Fluid: 120 mL  Total IN: 420 mL    OUT:    Voided (mL): 1 mL  Total OUT: 1 mL    Total NET: 419 mL          Impression: 89y Female admitted with Cellulitis of left lower extremity      PMH Hypertension    Hyperlipidemia    CAD (coronary artery disease)    Hypothyroidism    Wound of foot    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

## 2021-09-09 NOTE — PROGRESS NOTE ADULT - PROBLEM SELECTOR PLAN 2
Pt with poor pulses and hx of PAD/poor circulation.  - Continue asa, plavix and statin.  - Pending duplex ultrasound of LLE and physio of b/l lower extremities to evaluate PAD, per Vascular.  - F/u vascular studies.  - Vascular following.

## 2021-09-09 NOTE — PHYSICAL THERAPY INITIAL EVALUATION ADULT - ADDITIONAL COMMENTS
Pt is from [facility] and is non ambulatory and w/c bound. Pt stated she stands and pivots into wheelchair w/ assist and requires assistance for all ADLs.

## 2021-09-09 NOTE — PROGRESS NOTE ADULT - PROBLEM SELECTOR PLAN 10
- Pt has history of HIT (heparin-induced thrombocytopenia) in past  - 2.5 mg BID of Eliquis while inpatient, per Heme rec.  - SCDs  - Heme consulted, f/u recs.

## 2021-09-09 NOTE — PROGRESS NOTE ADULT - SUBJECTIVE AND OBJECTIVE BOX
Patient is a 89y old Female who presents with a chief complaint of L foot wound (09 Sep 2021 2:12)      INTERVAL HPI/OVERNIGHT EVENTS: Patient seen and examined at bedside. No notable overnight events. Patient complained about left foot pain that is very tender to palpation. She had no other complaints. Patient was found to have a new left heel ulcer that is now padded. Patient denies any fevers, chills, headaches, vision/hearing changes, chest pain, sob, abdominal pain, n/v/d/c.     MEDICATIONS  (STANDING):  apixaban 2.5 milliGRAM(s) Oral two times a day  aspirin enteric coated 81 milliGRAM(s) Oral daily  ATENolol  Tablet 100 milliGRAM(s) Oral daily  clopidogrel Tablet 75 milliGRAM(s) Oral daily  furosemide    Tablet 20 milliGRAM(s) Oral daily  influenza   Vaccine 0.5 milliLiter(s) IntraMuscular once  iron sucrose Injectable 100 milliGRAM(s) IV Push every 24 hours  levothyroxine 25 MICROGram(s) Oral daily  losartan 100 milliGRAM(s) Oral daily  piperacillin/tazobactam IVPB.. 3.375 Gram(s) IV Intermittent every 8 hours  senna 2 Tablet(s) Oral at bedtime  simvastatin 40 milliGRAM(s) Oral at bedtime    MEDICATIONS  (PRN):  acetaminophen   Tablet .. 650 milliGRAM(s) Oral every 6 hours PRN Temp greater or equal to 38C (100.4F), Mild Pain (1 - 3)  melatonin 3 milliGRAM(s) Oral at bedtime PRN Insomnia      Allergies    heparin (Other)    Intolerances      REVIEW OF SYSTEMS:  CONSTITUTIONAL: No fever or chills  HEENT:  No headaches, vision or hearing changes  RESPIRATORY: No cough, wheezing, or shortness of breath  CARDIOVASCULAR: No chest pain, chest pressure, palpitations  GASTROINTESTINAL: No abd pain, nausea, vomiting, diarrhea, or constipation  GENITOURINARY: No dysuria      Vital Signs Last 24 Hrs  T(C): 36.9 (09 Sep 2021 13:47), Max: 37.5 (09 Sep 2021 04:44)  T(F): 98.5 (09 Sep 2021 13:47), Max: 99.5 (09 Sep 2021 04:44)  HR: 68 (09 Sep 2021 13:47) (68 - 73)  BP: 173/90 (09 Sep 2021 13:47) (148/79 - 173/90)  RR: 17 (09 Sep 2021 13:47) (17 - 17)  SpO2: 94% (09 Sep 2021 13:47) (91% - 94%)      PHYSICAL EXAM:  GENERAL: NAD. Appears stated age. Temporal wasting.  HEENT: Anicteric, moist mucous membranes  CHEST/LUNG:  CTA b/l, no rales, wheezes, or rhonchi  HEART:  RRR, Normal S1, S2. No murmurs/rubs/gallop.  ABDOMEN:  BS+, soft, nontender, nondistended  EXTREMITIES: Ecchymoses throughout b/l upper and lower extremities. Left lower extremity has scaly/crusted appearance with erythematous dorsal foot and webspaces with broken skin. Purulent fluid in webspaces, tender to palpation. Bilateral lower extremities warm with bilateral DP and PT pulses difficult to appreciate. Stage 1 ulcer on left heel. No calf tenderness.  NERVOUS SYSTEM: AAOx3. Answers questions and follows commands appropriately      LABS:                        9.3    13.10 )-----------( 253      ( 09 Sep 2021 09:58 )             27.9     CBC Full  -  ( 09 Sep 2021 09:58 )  WBC Count : 13.10 K/uL  RBC Count : 3.20 M/uL  Hemoglobin : 9.3 g/dL  Hematocrit : 27.9 %  Platelet Count - Automated : 253 K/uL  Mean Cell Volume : 87.2 fl  Mean Cell Hemoglobin : 29.1 pg  Mean Cell Hemoglobin Concentration : 33.3 gm/dL  Auto Neutrophil # : x  Auto Lymphocyte # : x  Auto Monocyte # : x  Auto Eosinophil # : x  Auto Basophil # : x  Auto Neutrophil % : x  Auto Lymphocyte % : x  Auto Monocyte % : x  Auto Eosinophil % : x  Auto Basophil % : x    141  |  107  |  35<H>  ----------------------------<  121<H>       ( 09 Sep 2021 09:58 )  3.8   |  25  |  1.20    Ca    8.0<L>      09 Sep 2021 09:58    TPro  5.8<L>  /  Alb  1.9<L>  /  TBili  0.3  /  DBili  x   /  AST  22  /  ALT  17  /  AlkPhos  57  09-09        RADIOLOGY & ADDITIONAL TESTS:    EXAM:  XR FOOT COMP MIN 3 VIEWS LT                          PROCEDURE DATE:  09/06/2021      INTERPRETATION:  Ulcer left foot.    Advanced diffuse senile demineralization. Multiple hammertoe deformities. No fracture dislocation focal bone lysis or unusual periosteal reaction. Faint small vessel calcification. No soft tissue gas. Mild diffuse soft tissue edema    IMPRESSION: No acute or destructive osseous pathology. No plain film evidence of osteomyelitis.    --- End of Report ---  CONNOR CEDILLO MD; Attending Radiologist

## 2021-09-09 NOTE — PROGRESS NOTE ADULT - PROBLEM SELECTOR PLAN 1
- Pt has had increased redness and "oozing" per wound care nurse at [facility] the past 2-3 weeks and was started on outpatient course of Augmentin on 9/3 for 3x days with worsening wound appearance.  - Pt with history of foot wounds, past R heel wound required skin graft.  - Pt has new left heel ulcer, is padded to relieve pressure.  - Received Zosyn in ED - given outpatient choice not to cover MRSA - may have some data from OP podiatry.  - Continue Zosyn q8 per ID rec.  - Continue Tylenol 650mg PRN for mild pain.  - XRay of left foot was negative for osteomyelitis or any destructive osseous changes.  - MRI may be considered, per Podiatry rec.  - F/u blood cx - no growth to date.  - F/u wound cx - Numerous pseudomonas aeruginosa, enterobacter cloacae, and proteus mirabilis.  - ID Dr. Gonzalez following.  - Podiatry (d/w with Dr. Zheng) is following, appreciate recs.  - Vascular Dr. Grover following.

## 2021-09-09 NOTE — PROGRESS NOTE ADULT - PROBLEM SELECTOR PLAN 3
- Pt presented initially with BUN 54 Cr 1.60, unknown baseline Cr but was ~1.36 8/2021.  - Likely mild prerenal azotemia on CKD 3; received NS bolus 1L x1 in ED.  - AM labs (9/9) showed BUN 35 and Cr 1.20, stable now.  - Pt appears euvolemic on exam.  - Restarted home Lasix 20mg and ARB, as Cr returned to normal 9/7.  - Monitor daily BUN/Cr.

## 2021-09-09 NOTE — PROGRESS NOTE ADULT - SUBJECTIVE AND OBJECTIVE BOX
[facility]  INFECTIOUS DISEASES   [address] [place] [address]  Tel: [phone]     Fax: [phone]  =======================================================    [id number]  NITA ZURITA     Follow up: Left foot infection     No new changes or overnight event. No fever. Mild leukocytosis.  Culture with proteus and pseudomonas.     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 3.375 Gram(s) IV Intermittent every 8 hours     REVIEW OF SYSTEMS:  CONSTITUTIONAL:  No Fever or chills  HEENT:  No diplopia or blurred vision.  No sore throat or runny nose.  CARDIOVASCULAR:  No chest pain or SOB.  RESPIRATORY:  No cough, shortness of breath, PND or orthopnea.  GASTROINTESTINAL:  No nausea, vomiting or diarrhea.  GENITOURINARY:  No dysuria, frequency or urgency. No Blood in urine  MUSCULOSKELETAL:  no joint aches, no muscle pain  SKIN:  foot ulcer and pain   PSYCHIATRIC:  No disorder of thought or mood.  ENDOCRINE:  No heat or cold intolerance, polyuria or polydipsia.  HEMATOLOGICAL:  No easy bruising or bleeding.     Physical Exam:  Vital Signs Last 24 Hrs  T(C): 36.9 (09 Sep 2021 13:47), Max: 37.5 (09 Sep 2021 04:44)  T(F): 98.5 (09 Sep 2021 13:47), Max: 99.5 (09 Sep 2021 04:44)  HR: 68 (09 Sep 2021 13:47) (68 - 73)  BP: 173/90 (09 Sep 2021 13:47) (148/79 - 173/90)  BP(mean): --  RR: 17 (09 Sep 2021 13:47) (17 - 17)  SpO2: 94% (09 Sep 2021 13:47) (91% - 94%)  GEN: NAD  HEENT: normocephalic and atraumatic. EOMI. PERRL.    NECK: Supple.  No lymphadenopathy   LUNGS: Clear to auscultation.  HEART: Regular rate and rhythm   ABDOMEN: Soft, nontender, and nondistended.  Positive bowel sounds.    : No CVA tenderness  EXTREMITIES: Lower extremities with multiple crusted ulcers in lower legs, chronic skin changes, left foot with hammer toes in 2nd and 3rd toes with ischemic changes, between toes from 1 to 3rd there are some pus discharge that was cleaned   NEUROLOGIC: grossly intact.  PSYCHIATRIC: Appropriate affect .  SKIN: No rash      Labs:                        9.3    13.10 )-----------( 253      ( 09 Sep 2021 09:58 )             27.9     09-09    141  |  107  |  35<H>  ----------------------------<  121<H>  3.8   |  25  |  1.20    Ca    8.0<L>      09 Sep 2021 09:58    TPro  5.8<L>  /  Alb  1.9<L>  /  TBili  0.3  /  DBili  x   /  AST  22  /  ALT  17  /  AlkPhos  57  09-09    Culture - Abscess with Gram Stain (collected 09-07-21 @ 15:29)  Source: .Abscess left foot  Organism: Pseudomonas aeruginosa  Proteus mirabilis (09-09-21 @ 10:39)  Organism: Proteus mirabilis (09-09-21 @ 10:39)    Sensitivities:      -  Amikacin: S <=16      -  Amoxicillin/Clavulanic Acid: S <=8/4      -  Ampicillin: R >16 These ampicillin results predict results for amoxicillin      -  Ampicillin/Sulbactam: R >16/8 Enterobacter, Citrobacter, and Serratia may develop resistance during prolonged therapy (3-4 days)      -  Aztreonam: S <=4      -  Cefazolin: R >16 Enterobacter, Citrobacter, and Serratia may develop resistance during prolonged therapy (3-4 days)      -  Cefepime: S <=2      -  Cefoxitin: S <=8      -  Ceftriaxone: S <=1 Enterobacter, Citrobacter, and Serratia may develop resistance during prolonged therapy      -  Ciprofloxacin: R 2      -  Ertapenem: S <=0.5      -  Gentamicin: S 4      -  Levofloxacin: R 4      -  Meropenem: S <=1      -  Piperacillin/Tazobactam: S <=8      -  Tobramycin: S <=2      -  Trimethoprim/Sulfamethoxazole: R >2/38      Method Type: JOVANNA  Organism: Pseudomonas aeruginosa (09-09-21 @ 10:38)    Sensitivities:      -  Amikacin: S <=16      -  Aztreonam: S <=4      -  Cefepime: S <=2      -  Ceftazidime: S <=1      -  Ciprofloxacin: S <=0.25      -  Gentamicin: S <=2      -  Imipenem: S <=1      -  Levofloxacin: S <=0.5      -  Meropenem: S <=1      -  Piperacillin/Tazobactam: S <=8      -  Tobramycin: S <=2      Method Type: JOVANNA    Culture - Other (collected 09-07-21 @ 03:16)  Source: .Other left foot wound    Culture - Blood (collected 09-07-21 @ 01:45)  Source: .Blood Blood    Culture - Blood (collected 09-07-21 @ 01:43)  Source: .Blood Blood    WBC Count: 13.10 K/uL (09-09-21 @ 09:58)  WBC Count: 10.78 K/uL (09-08-21 @ 07:33)  WBC Count: 11.72 K/uL (09-07-21 @ 08:45)  WBC Count: 11.07 K/uL (09-06-21 @ 15:10)    Creatinine, Serum: 1.20 mg/dL (09-09-21 @ 09:58)  Creatinine, Serum: 1.20 mg/dL (09-08-21 @ 07:33)  Creatinine, Serum: 1.20 mg/dL (09-07-21 @ 08:45)  Creatinine, Serum: 1.60 mg/dL (09-06-21 @ 15:10)    C-Reactive Protein, Serum: 21 mg/L (09-07-21 @ 00:16)    Ferritin, Serum: 82 ng/mL (09-07-21 @ 12:17)    Sedimentation Rate, Erythrocyte: 53 mm/hr (09-06-21 @ 15:10)    COVID-19 PCR: NotDetec (09-06-21 @ 15:15)    All imaging and other data have been reviewed.  < from: Xray Foot AP + Lateral + Oblique, Left (09.06.21 @ 14:48) >  IMPRESSION: No acute or destructive osseous pathology. No plain film evidence of osteomyelitis.      Assessment and Plan:   89y Female, from [facility], with PMH of HTN, CAD, PAD, hypothyroidism, chronic wounds, was admitted with left foot wound.   Chronic skin changes with areas of open wounds and some pus discharge between toes, possibly main problem is the ischemia, for superficial ulcer can treat for possible cellulitis,   I wouldn't be very aggressive at this time.     1- Left foot wound and infection with ischemia    Recommendations:   - Blood culture NGTD  - Wound culture with pseudomonas and proteus S to cefepime  - Xray neg for OM, will proceed with MRI   - Vascular work up pending   - Will narrow the coverage to cefepime 2gm q12    - Based on goal of care will decide about the treatment.     Will follow.    Janet Gonzalez MD  Division of Infectious Diseases   Cell [phone] between 8am and 6pm   After 6pm and weekends please call ID service at [phone].

## 2021-09-10 DIAGNOSIS — E87.6 HYPOKALEMIA: ICD-10-CM

## 2021-09-10 LAB
-  AMIKACIN: SIGNIFICANT CHANGE UP
-  AMOXICILLIN/CLAVULANIC ACID: SIGNIFICANT CHANGE UP
-  AMPICILLIN/SULBACTAM: SIGNIFICANT CHANGE UP
-  AMPICILLIN: SIGNIFICANT CHANGE UP
-  AZTREONAM: SIGNIFICANT CHANGE UP
-  CEFAZOLIN: SIGNIFICANT CHANGE UP
-  CEFEPIME: SIGNIFICANT CHANGE UP
-  CEFOXITIN: SIGNIFICANT CHANGE UP
-  CEFTRIAXONE: SIGNIFICANT CHANGE UP
-  CIPROFLOXACIN: SIGNIFICANT CHANGE UP
-  ERTAPENEM: SIGNIFICANT CHANGE UP
-  GENTAMICIN: SIGNIFICANT CHANGE UP
-  LEVOFLOXACIN: SIGNIFICANT CHANGE UP
-  MEROPENEM: SIGNIFICANT CHANGE UP
-  PIPERACILLIN/TAZOBACTAM: SIGNIFICANT CHANGE UP
-  TOBRAMYCIN: SIGNIFICANT CHANGE UP
-  TRIMETHOPRIM/SULFAMETHOXAZOLE: SIGNIFICANT CHANGE UP
ANION GAP SERPL CALC-SCNC: 6 MMOL/L — SIGNIFICANT CHANGE UP (ref 5–17)
BUN SERPL-MCNC: 43 MG/DL — HIGH (ref 7–23)
CALCIUM SERPL-MCNC: 8.4 MG/DL — LOW (ref 8.5–10.1)
CHLORIDE SERPL-SCNC: 109 MMOL/L — HIGH (ref 96–108)
CO2 SERPL-SCNC: 26 MMOL/L — SIGNIFICANT CHANGE UP (ref 22–31)
CREAT SERPL-MCNC: 1.2 MG/DL — SIGNIFICANT CHANGE UP (ref 0.5–1.3)
GLUCOSE SERPL-MCNC: 82 MG/DL — SIGNIFICANT CHANGE UP (ref 70–99)
HCT VFR BLD CALC: 28.5 % — LOW (ref 34.5–45)
HGB BLD-MCNC: 9.4 G/DL — LOW (ref 11.5–15.5)
MCHC RBC-ENTMCNC: 28.7 PG — SIGNIFICANT CHANGE UP (ref 27–34)
MCHC RBC-ENTMCNC: 33 GM/DL — SIGNIFICANT CHANGE UP (ref 32–36)
MCV RBC AUTO: 87.2 FL — SIGNIFICANT CHANGE UP (ref 80–100)
METHOD TYPE: SIGNIFICANT CHANGE UP
NRBC # BLD: 0 /100 WBCS — SIGNIFICANT CHANGE UP (ref 0–0)
PLATELET # BLD AUTO: 258 K/UL — SIGNIFICANT CHANGE UP (ref 150–400)
POTASSIUM SERPL-MCNC: 3.4 MMOL/L — LOW (ref 3.5–5.3)
POTASSIUM SERPL-SCNC: 3.4 MMOL/L — LOW (ref 3.5–5.3)
RBC # BLD: 3.27 M/UL — LOW (ref 3.8–5.2)
RBC # FLD: 15.5 % — HIGH (ref 10.3–14.5)
SODIUM SERPL-SCNC: 141 MMOL/L — SIGNIFICANT CHANGE UP (ref 135–145)
WBC # BLD: 12.22 K/UL — HIGH (ref 3.8–10.5)
WBC # FLD AUTO: 12.22 K/UL — HIGH (ref 3.8–10.5)

## 2021-09-10 PROCEDURE — 99232 SBSQ HOSP IP/OBS MODERATE 35: CPT

## 2021-09-10 PROCEDURE — 99233 SBSQ HOSP IP/OBS HIGH 50: CPT

## 2021-09-10 RX ORDER — POTASSIUM CHLORIDE 20 MEQ
40 PACKET (EA) ORAL EVERY 4 HOURS
Refills: 0 | Status: COMPLETED | OUTPATIENT
Start: 2021-09-10 | End: 2021-09-10

## 2021-09-10 RX ADMIN — SENNA PLUS 2 TABLET(S): 8.6 TABLET ORAL at 22:21

## 2021-09-10 RX ADMIN — ATENOLOL 100 MILLIGRAM(S): 25 TABLET ORAL at 05:12

## 2021-09-10 RX ADMIN — Medication 3 MILLIGRAM(S): at 22:21

## 2021-09-10 RX ADMIN — CLOPIDOGREL BISULFATE 75 MILLIGRAM(S): 75 TABLET, FILM COATED ORAL at 11:50

## 2021-09-10 RX ADMIN — APIXABAN 2.5 MILLIGRAM(S): 2.5 TABLET, FILM COATED ORAL at 18:02

## 2021-09-10 RX ADMIN — Medication 10 MILLIGRAM(S): at 05:12

## 2021-09-10 RX ADMIN — LOSARTAN POTASSIUM 100 MILLIGRAM(S): 100 TABLET, FILM COATED ORAL at 05:11

## 2021-09-10 RX ADMIN — Medication 40 MILLIEQUIVALENT(S): at 18:02

## 2021-09-10 RX ADMIN — Medication 25 MICROGRAM(S): at 05:12

## 2021-09-10 RX ADMIN — CEFEPIME 100 MILLIGRAM(S): 1 INJECTION, POWDER, FOR SOLUTION INTRAMUSCULAR; INTRAVENOUS at 05:11

## 2021-09-10 RX ADMIN — Medication 81 MILLIGRAM(S): at 11:50

## 2021-09-10 RX ADMIN — Medication 20 MILLIGRAM(S): at 05:12

## 2021-09-10 RX ADMIN — Medication 10 MILLIGRAM(S): at 22:21

## 2021-09-10 RX ADMIN — Medication 10 MILLIGRAM(S): at 16:11

## 2021-09-10 RX ADMIN — Medication 650 MILLIGRAM(S): at 19:15

## 2021-09-10 RX ADMIN — SIMVASTATIN 40 MILLIGRAM(S): 20 TABLET, FILM COATED ORAL at 22:21

## 2021-09-10 RX ADMIN — CEFEPIME 100 MILLIGRAM(S): 1 INJECTION, POWDER, FOR SOLUTION INTRAMUSCULAR; INTRAVENOUS at 18:01

## 2021-09-10 RX ADMIN — Medication 40 MILLIEQUIVALENT(S): at 11:49

## 2021-09-10 RX ADMIN — APIXABAN 2.5 MILLIGRAM(S): 2.5 TABLET, FILM COATED ORAL at 05:11

## 2021-09-10 RX ADMIN — Medication 650 MILLIGRAM(S): at 18:15

## 2021-09-10 RX ADMIN — IRON SUCROSE 100 MILLIGRAM(S): 20 INJECTION, SOLUTION INTRAVENOUS at 18:02

## 2021-09-10 NOTE — PROGRESS NOTE ADULT - PROBLEM SELECTOR PLAN 2
Pt with poor pulses and hx of PAD/poor circulation.  - Continue asa, plavix and statin.  - [facility] duplex LLE showed diffuse calcified atherosclerosis, stenoses in common femoral and distal popliteal a. with decreased flow below knee, peroneal a. occluded, and posterior tibial a. nearly occluded.  - [facility] physio of b/l lower extremities showed right LAI of 1.1 with decreased pulse wave in digits and left LAI of 1.09 with normal pulse wave.  - F/u vascular recs after vascular studies.  - Vascular following.

## 2021-09-10 NOTE — PROGRESS NOTE ADULT - PROBLEM SELECTOR PLAN 3
- Pt presented initially with BUN 54 Cr 1.60, unknown baseline Cr but was ~1.36 8/2021.  - Likely mild prerenal azotemia on CKD 3; received NS bolus 1L x1 in ED.  - AM labs (9/9) showed BUN 35 and Cr 1.20, stable now.  - Pt appears euvolemic on exam.  - Restarted home Lasix 20mg and ARB, as Cr returned to normal 9/7.  - Monitor daily BUN/Cr. - Pt presented initially with BUN 54 Cr 1.60, unknown baseline Cr but was ~1.36 8/2021.  - Likely mild prerenal azotemia on CKD 3; received NS bolus 1L x1 in ED.  - AM labs (9/10) showed BUN 43 and Cr 1.20, stable now.  - Pt appears euvolemic on exam.  - Restarted home Lasix 20mg and ARB, as Cr returned to normal 9/7.  - Monitor daily BUN/Cr. - Pt presented initially with BUN 54 Cr 1.60, unknown baseline Cr but was ~1.36 8/2021.  - Likely mild prerenal azotemia on CKD 3; received NS bolus 1L x1 in ED.  - appears resolved   -monitor renal function

## 2021-09-10 NOTE — PROGRESS NOTE ADULT - ASSESSMENT
89F with PMHx CAD, HTN, HLD, hypothyroidism, chronic foot wounds, from [facility], sent in for L foot wound that has failed outpatient antibiotic therapy, admitting for IV abx treatment of left foot cellulitis, diabetic foot ulcer with gangrene of the left 2nd-4th digits and concern for OM.      89F with PMHx CAD, HTN, HLD, hypothyroidism, chronic foot wounds, from [facility], sent in for L foot wound that has failed outpatient antibiotic therapy, admitted for IV abx treatment of left foot cellulitis, diabetic foot ulcer with gangrene of the left 2nd-4th digits and concern for OM.

## 2021-09-10 NOTE — PROGRESS NOTE ADULT - PROBLEM SELECTOR PLAN 1
- Patient seen and evaluated   - Wounds dressed with Aquacel ag and dsd  - Xrays appreciated above  - Possible MRI of the left foot to r/o OM  - Follow up with the vascular consultation and recommendations   - Podiatry team will continue to follow patient while in house  - wIll contact patient's daughter about treatment plan  - Wound Care Instructions below  WOUND CARE INSTRUCTIONS   1. Cleanse wound with sterile saline solution and gently pat dry.  2. Dress wound with Aquacel Ag and dry, sterile dressing.  3. Change dressings every 2 days and monitor for any signs of infection.  4. Patient is to [facility] with Dr. Irizarry or Dr. Zheng within 1 week upon discharge.

## 2021-09-10 NOTE — PROGRESS NOTE ADULT - PROBLEM SELECTOR PLAN 6
- On losartan 100mg and atenolol 100mg at home  - Will continue atenolol with hold parameters. Resumed losartan.  - Monitor renal indices Morning K+ was 3.4 (9/10)  - Replete with PO potassium chloride  - F/u K+ and Mg in morning Morning K+ was 3.4 (9/10)  - Replete with PO potassium chloride 40 milliEq x 2  - F/u K+ and Mg in morning Morning K+ was 3.4 (9/10)  - Repleted with PO potassium chloride 40 milliEq x 2  - F/u K+ and Mg in morning

## 2021-09-10 NOTE — PROGRESS NOTE ADULT - SUBJECTIVE AND OBJECTIVE BOX
CHARTING IN PROGRESS    Patient is a 89y old Female who presents with a chief complaint of L foot wound (10 Sep 2021 9:00)      INTERVAL HPI/OVERNIGHT EVENTS: Patient seen and examined at bedside. No notable overnight events. Patient complained about left foot pain that feels about the same as yesterday. Describes the pain as a sensation of being frozen and is tender to palpation. She had no other complaints. Patient denies any fevers, chills, headaches, vision/hearing changes, chest pain, sob, abdominal pain, n/v/d/c.     MEDICATIONS  (STANDING):  apixaban 2.5 milliGRAM(s) Oral two times a day  aspirin enteric coated 81 milliGRAM(s) Oral daily  ATENolol  Tablet 100 milliGRAM(s) Oral daily  cefepime   IVPB 2000 milliGRAM(s) IV Intermittent every 12 hours  clopidogrel Tablet 75 milliGRAM(s) Oral daily  furosemide    Tablet 20 milliGRAM(s) Oral daily  hydrALAZINE 10 milliGRAM(s) Oral every 8 hours  influenza   Vaccine 0.5 milliLiter(s) IntraMuscular once  iron sucrose Injectable 100 milliGRAM(s) IV Push every 24 hours  levothyroxine 25 MICROGram(s) Oral daily  losartan 100 milliGRAM(s) Oral daily  senna 2 Tablet(s) Oral at bedtime  simvastatin 40 milliGRAM(s) Oral at bedtime    MEDICATIONS  (PRN):  acetaminophen   Tablet .. 650 milliGRAM(s) Oral every 6 hours PRN Temp greater or equal to 38C (100.4F), Mild Pain (1 - 3)  melatonin 3 milliGRAM(s) Oral at bedtime PRN Insomnia      Allergies    heparin (Other)    Intolerances      REVIEW OF SYSTEMS:  CONSTITUTIONAL: No fever or chills  HEENT:  No headaches, vision or hearing changes  RESPIRATORY: No cough, wheezing, or shortness of breath  CARDIOVASCULAR: No chest pain, chest pressure, palpitations  GASTROINTESTINAL: No abd pain, nausea, vomiting, diarrhea, or constipation  GENITOURINARY: No dysuria  NERVOUS SYSTEM: No focal weakness or       Vital Signs Last 24 Hrs  T(C): 36.6 (10 Sep 2021 05:07), Max: 37.2 (09 Sep 2021 21:17)  T(F): 97.9 (10 Sep 2021 05:07), Max: 98.9 (09 Sep 2021 21:17)  HR: 61 (10 Sep 2021 05:07) (61 - 68)  BP: 112/56 (10 Sep 2021 05:07) (112/56 - 173/90)  RR: 17 (10 Sep 2021 05:07) (17 - 17)  SpO2: 94% (10 Sep 2021 05:07) (94% - 94%)      PHYSICAL EXAM:  GENERAL: NAD. Appears stated age. Temporal wasting.  HEENT: Anicteric, moist mucous membranes  CHEST/LUNG:  CTA b/l, no rales, wheezes, or rhonchi  HEART:  RRR, Normal S1, S2. No murmurs/rubs/gallop.  ABDOMEN:  BS+, soft, nontender, nondistended  EXTREMITIES: Ecchymoses throughout b/l upper and lower extremities. Left lower extremity has scaly/crusted appearance with erythematous dorsal foot and webspaces with broken skin. Purulent fluid in webspaces, tender to palpation. Bilateral lower extremities warm with bilateral DP and PT pulses difficult to appreciate.  Ischemic changes on both lower extremities. Stage 1 ulcer on left heel. No calf tenderness.  NERVOUS SYSTEM: AAOx3. Answers questions and follows commands appropriately      LABS:               9.4    12.22 )-----------( 258      ( 10 Sep 2021 08:30 )             28.5     CBC Full  -  ( 10 Sep 2021 08:30 )  WBC Count : 12.22 K/uL  RBC Count : 3.27 M/uL  Hemoglobin : 9.4 g/dL  Hematocrit : 28.5 %  Platelet Count - Automated : 258 K/uL  Mean Cell Volume : 87.2 fl  Mean Cell Hemoglobin : 28.7 pg  Mean Cell Hemoglobin Concentration : 33.0 gm/dL  Auto Neutrophil # : x  Auto Lymphocyte # : x  Auto Monocyte # : x  Auto Eosinophil # : x  Auto Basophil # : x  Auto Neutrophil % : x  Auto Lymphocyte % : x  Auto Monocyte % : x  Auto Eosinophil % : x  Auto Basophil % : x    141    |  109    |  43     ----------------------------<  82       ( 10 Sep 2021 08:30 )  3.4     |  26     |  1.20     Ca    8.4        10 Sep 2021 08:30    TPro  5.8    /  Alb  1.9    /  TBili  0.3    /  DBili  x      /  AST  22     /  ALT  17     /  AlkPhos  57     09 Sep 2021 09:58    LIVER FUNCTIONS - ( 09 Sep 2021 09:58 )  Alb: 1.9 g/dL / Pro: 5.8 g/dL / ALK PHOS: 57 U/L / ALT: 17 U/L / AST: 22 U/L / GGT: x           Culture - Abscess with Gram Stain (collected 07 Sep 2021 15:29)  Source: .Abscess left foot  Preliminary Report (09 Sep 2021 10:39):    Numerous Enterobacter cloacae complex Susceptibility to follow.    Numerous Pseudomonas aeruginosa    Numerous Proteus mirabilis    Normal skin joseline isolated  Organism: Pseudomonas aeruginosa  Proteus mirabilis (09 Sep 2021 10:39)  Organism: Proteus mirabilis (09 Sep 2021 10:39)  Organism: Pseudomonas aeruginosa (09 Sep 2021 10:38)      Culture - Other (collected 07 Sep 2021 03:16)  Source: .Other left foot wound  Preliminary Report (09 Sep 2021 11:06):    Numerous Pseudomonas aeruginosa Susceptibility to follow.    Moderate Proteus mirabilis    Culture - Blood (collected 07 Sep 2021 01:45)  Source: .Blood Blood  Preliminary Report (08 Sep 2021 02:02):    No growth to date.    Culture - Blood (collected 07 Sep 2021 01:43)  Source: .Blood Blood  Preliminary Report (08 Sep 2021 02:02):    No growth to date.      RADIOLOGY & ADDITIONAL TESTS:    EXAM:  XR FOOT COMP MIN 3 VIEWS LT                        PROCEDURE DATE:  09/06/2021    INTERPRETATION:  Ulcer left foot.    Advanced diffuse senile demineralization. Multiple hammertoe deformities. No fracture dislocation focal bone lysis or unusual periosteal reaction. Faint small vessel calcification. No soft tissue gas. Mild diffuse soft tissue edema    IMPRESSION: No acute or destructive osseous pathology. No plain film evidence of osteomyelitis.    --- End of Report ---  CONNOR CEDILLO MD; Attending Radiologist        EXAM:  DUPLEX LOW ARTERIES UNI LTD LT                        PROCEDURE DATE:  09/09/2021      INTERPRETATION:  US LOWER EXTREMITY ARTERIAL DUPLEX LIMITED LEFT  CLINICAL INFORMATION:  Peripheral artery disease of the left leg with pain and ulceration    COMPARISON: None  Real-time sonography of the left lower extremity arterial system was performed using a high-resolution linear array transducer and including color and spectral Doppler.  Diffuse calcified plaque throughout the leftleg. No soft tissue abnormalities are demonstrated in the left lower extremity. Flow phase patterns and peak systolic velocity measurements (in cm/s) were observed as follows:    LEFT:  CFA: Biphasic; 177  Proximal SFA: Biphasic; 99  Mid SFA: Biphasic; 46  Distal SFA: Biphasic;  100  Popliteal: Biphasic;  131  Anterior tibial: Tardus parvus; 65  Posterior tibial: Tardus parvus; 17  Peroneal: Occluded;  Dorsalis pedis: Tardus parvus;  71    IMPRESSION:  Diffuse calcific atherosclerosis throughout the left leg.  Focal stenoses in the common femoral artery and distal popliteal artery with diminished flow below the knee.  Peroneal artery is occluded.  Posterior tibial artery is nearly occluded.    --- End of Report ---  FABIOLA ROWE MD; Attending Radiologist        EXAM:  PHYSIOL EXTREM LOW 3+ LEV BI                        PROCEDURE DATE:  09/09/2021    INTERPRETATION:  Clinical Information: Peripheral vascular disease.    Technique: Bilateral lower extremity ABIs/PVR  Comparison: None    Findings/  Impression:  Right lower extremity: The ankle brachial index is 1.1. The pulse waveforms are diminished in the digits.    Left lower extremity: The ankle brachial index is 1.09. The pulse waveforms are normal. Limited segmental pressures on the left secondary to pain.    --- End of Report ---  FABIOLA ROWE MD; Attending Radiologist         CHARTING IN PROGRESS    Patient is a 89y old Female who presents with a chief complaint of L foot wound (10 Sep 2021 9:00)      INTERVAL HPI/OVERNIGHT EVENTS: Patient seen and examined at bedside. No notable overnight events. Patient complained about left foot pain that feels about the same as yesterday. Describes the pain as a sensation of being frozen and is tender to palpation. She had no other complaints. Patient denies any fevers, chills, headaches, vision/hearing changes, chest pain, sob, abdominal pain, n/v/d/c.     MEDICATIONS  (STANDING):  apixaban 2.5 milliGRAM(s) Oral two times a day  aspirin enteric coated 81 milliGRAM(s) Oral daily  ATENolol  Tablet 100 milliGRAM(s) Oral daily  cefepime   IVPB 2000 milliGRAM(s) IV Intermittent every 12 hours  clopidogrel Tablet 75 milliGRAM(s) Oral daily  furosemide    Tablet 20 milliGRAM(s) Oral daily  hydrALAZINE 10 milliGRAM(s) Oral every 8 hours  influenza   Vaccine 0.5 milliLiter(s) IntraMuscular once  iron sucrose Injectable 100 milliGRAM(s) IV Push every 24 hours  levothyroxine 25 MICROGram(s) Oral daily  losartan 100 milliGRAM(s) Oral daily  senna 2 Tablet(s) Oral at bedtime  simvastatin 40 milliGRAM(s) Oral at bedtime    MEDICATIONS  (PRN):  acetaminophen   Tablet .. 650 milliGRAM(s) Oral every 6 hours PRN Temp greater or equal to 38C (100.4F), Mild Pain (1 - 3)  melatonin 3 milliGRAM(s) Oral at bedtime PRN Insomnia      Allergies    heparin (Other)    Intolerances      REVIEW OF SYSTEMS:  CONSTITUTIONAL: No fever or chills  HEENT:  No headaches, vision or hearing changes  RESPIRATORY: No cough, wheezing, or shortness of breath  CARDIOVASCULAR: No chest pain, chest pressure, palpitations  GASTROINTESTINAL: No abd pain, nausea, vomiting, diarrhea, or constipation  GENITOURINARY: No dysuria  NERVOUS SYSTEM: No focal weakness or       Vital Signs Last 24 Hrs  T(C): 36.6 (10 Sep 2021 05:07), Max: 37.2 (09 Sep 2021 21:17)  T(F): 97.9 (10 Sep 2021 05:07), Max: 98.9 (09 Sep 2021 21:17)  HR: 61 (10 Sep 2021 05:07) (61 - 68)  BP: 112/56 (10 Sep 2021 05:07) (112/56 - 173/90)  RR: 17 (10 Sep 2021 05:07) (17 - 17)  SpO2: 94% (10 Sep 2021 05:07) (94% - 94%)      PHYSICAL EXAM:  GENERAL: NAD. Appears stated age. Temporal wasting.  HEENT: Anicteric, moist mucous membranes  CHEST/LUNG:  CTA b/l, no rales, wheezes, or rhonchi  HEART:  RRR, Normal S1, S2. No murmurs/rubs/gallop.  ABDOMEN:  BS+, soft, nontender, nondistended  EXTREMITIES: Ecchymoses throughout b/l upper and lower extremities. Left lower extremity has scaly/crusted appearance with erythematous dorsal foot and webspaces with broken skin. Purulent fluid in webspaces, tender to palpation. Bilateral lower extremities warm with bilateral DP and PT pulses difficult to appreciate.  Ischemic changes on both lower extremities. Stage 1 ulcer on left heel. No calf tenderness.  NERVOUS SYSTEM: AAOx3. Answers questions and follows commands appropriately      LABS:               9.4    12.22 )-----------( 258      ( 10 Sep 2021 08:30 )             28.5     CBC Full  -  ( 10 Sep 2021 08:30 )  WBC Count : 12.22 K/uL  RBC Count : 3.27 M/uL  Hemoglobin : 9.4 g/dL  Hematocrit : 28.5 %  Platelet Count - Automated : 258 K/uL  Mean Cell Volume : 87.2 fl  Mean Cell Hemoglobin : 28.7 pg  Mean Cell Hemoglobin Concentration : 33.0 gm/dL  Auto Neutrophil # : x  Auto Lymphocyte # : x  Auto Monocyte # : x  Auto Eosinophil # : x  Auto Basophil # : x  Auto Neutrophil % : x  Auto Lymphocyte % : x  Auto Monocyte % : x  Auto Eosinophil % : x  Auto Basophil % : x    141    |  109    |  43     ----------------------------<  82       ( 10 Sep 2021 08:30 )  3.4     |  26     |  1.20     Ca    8.4        10 Sep 2021 08:30    TPro  5.8    /  Alb  1.9    /  TBili  0.3    /  DBili  x      /  AST  22     /  ALT  17     /  AlkPhos  57     09 Sep 2021 09:58    LIVER FUNCTIONS - ( 09 Sep 2021 09:58 )  Alb: 1.9 g/dL / Pro: 5.8 g/dL / ALK PHOS: 57 U/L / ALT: 17 U/L / AST: 22 U/L / GGT: x           Culture - Abscess with Gram Stain (collected 07 Sep 2021 15:29)  Source: .Abscess left foot  Preliminary Report (09 Sep 2021 10:39):    Numerous Enterobacter cloacae complex Susceptibility to follow.    Numerous Pseudomonas aeruginosa    Numerous Proteus mirabilis    Normal skin joseline isolated  Organism: Pseudomonas aeruginosa  Proteus mirabilis (09 Sep 2021 10:39)  Organism: Proteus mirabilis (09 Sep 2021 10:39)  Organism: Pseudomonas aeruginosa (09 Sep 2021 10:38)    Culture - Other (collected 07 Sep 2021 03:16)  Source: .Other left foot wound  Preliminary Report (09 Sep 2021 11:06):    Numerous Pseudomonas aeruginosa Susceptibility to follow.    Moderate Proteus mirabilis    Culture - Blood (collected 07 Sep 2021 01:45)  Source: .Blood Blood  Preliminary Report (08 Sep 2021 02:02):    No growth to date.    Culture - Blood (collected 07 Sep 2021 01:43)  Source: .Blood Blood  Preliminary Report (08 Sep 2021 02:02):    No growth to date.      RADIOLOGY & ADDITIONAL TESTS:    EXAM:  XR FOOT COMP MIN 3 VIEWS LT                        PROCEDURE DATE:  09/06/2021    INTERPRETATION:  Ulcer left foot.    Advanced diffuse senile demineralization. Multiple hammertoe deformities. No fracture dislocation focal bone lysis or unusual periosteal reaction. Faint small vessel calcification. No soft tissue gas. Mild diffuse soft tissue edema    IMPRESSION: No acute or destructive osseous pathology. No plain film evidence of osteomyelitis.    --- End of Report ---  CONNOR CEDILLO MD; Attending Radiologist        EXAM:  DUPLEX LOW ARTERIES UNI LTD LT                        PROCEDURE DATE:  09/09/2021      INTERPRETATION:  US LOWER EXTREMITY ARTERIAL DUPLEX LIMITED LEFT  CLINICAL INFORMATION:  Peripheral artery disease of the left leg with pain and ulceration    COMPARISON: None  Real-time sonography of the left lower extremity arterial system was performed using a high-resolution linear array transducer and including color and spectral Doppler.  Diffuse calcified plaque throughout the leftleg. No soft tissue abnormalities are demonstrated in the left lower extremity. Flow phase patterns and peak systolic velocity measurements (in cm/s) were observed as follows:    LEFT:  CFA: Biphasic; 177  Proximal SFA: Biphasic; 99  Mid SFA: Biphasic; 46  Distal SFA: Biphasic;  100  Popliteal: Biphasic;  131  Anterior tibial: Tardus parvus; 65  Posterior tibial: Tardus parvus; 17  Peroneal: Occluded;  Dorsalis pedis: Tardus parvus;  71    IMPRESSION:  Diffuse calcific atherosclerosis throughout the left leg.  Focal stenoses in the common femoral artery and distal popliteal artery with diminished flow below the knee.  Peroneal artery is occluded.  Posterior tibial artery is nearly occluded.    --- End of Report ---  FABIOLA ROWE MD; Attending Radiologist        EXAM:  PHYSIOL EXTREM LOW 3+ LEV BI                        PROCEDURE DATE:  09/09/2021    INTERPRETATION:  Clinical Information: Peripheral vascular disease.    Technique: Bilateral lower extremity ABIs/PVR  Comparison: None    Findings/  Impression:  Right lower extremity: The ankle brachial index is 1.1. The pulse waveforms are diminished in the digits.    Left lower extremity: The ankle brachial index is 1.09. The pulse waveforms are normal. Limited segmental pressures on the left secondary to pain.    --- End of Report ---  FABIOLA ROWE MD; Attending Radiologist         Patient is a 89y old Female who presents with a chief complaint of L foot wound (10 Sep 2021 9:00)      INTERVAL HPI/OVERNIGHT EVENTS: Patient seen and examined at bedside. No notable overnight events. Patient complained about left foot pain that feels about the same as yesterday. Describes the pain as a sensation of being frozen and is tender to palpation. She had no other complaints. Patient denies any fevers, chills, headaches, vision/hearing changes, chest pain, sob, abdominal pain, n/v/d/c.     MEDICATIONS  (STANDING):  apixaban 2.5 milliGRAM(s) Oral two times a day  aspirin enteric coated 81 milliGRAM(s) Oral daily  ATENolol  Tablet 100 milliGRAM(s) Oral daily  cefepime   IVPB 2000 milliGRAM(s) IV Intermittent every 12 hours  clopidogrel Tablet 75 milliGRAM(s) Oral daily  furosemide    Tablet 20 milliGRAM(s) Oral daily  hydrALAZINE 10 milliGRAM(s) Oral every 8 hours  influenza   Vaccine 0.5 milliLiter(s) IntraMuscular once  iron sucrose Injectable 100 milliGRAM(s) IV Push every 24 hours  levothyroxine 25 MICROGram(s) Oral daily  losartan 100 milliGRAM(s) Oral daily  senna 2 Tablet(s) Oral at bedtime  simvastatin 40 milliGRAM(s) Oral at bedtime    MEDICATIONS  (PRN):  acetaminophen   Tablet .. 650 milliGRAM(s) Oral every 6 hours PRN Temp greater or equal to 38C (100.4F), Mild Pain (1 - 3)  melatonin 3 milliGRAM(s) Oral at bedtime PRN Insomnia      Allergies    heparin (Other)    Intolerances      REVIEW OF SYSTEMS:  CONSTITUTIONAL: No fever or chills  HEENT:  No headaches, vision or hearing changes  RESPIRATORY: No cough, wheezing, or shortness of breath  CARDIOVASCULAR: No chest pain, chest pressure, palpitations  GASTROINTESTINAL: No abd pain, nausea, vomiting, diarrhea, or constipation  GENITOURINARY: No dysuria  NERVOUS SYSTEM: No focal weakness or       Vital Signs Last 24 Hrs  T(C): 36.6 (10 Sep 2021 05:07), Max: 37.2 (09 Sep 2021 21:17)  T(F): 97.9 (10 Sep 2021 05:07), Max: 98.9 (09 Sep 2021 21:17)  HR: 61 (10 Sep 2021 05:07) (61 - 68)  BP: 112/56 (10 Sep 2021 05:07) (112/56 - 173/90)  RR: 17 (10 Sep 2021 05:07) (17 - 17)  SpO2: 94% (10 Sep 2021 05:07) (94% - 94%)      PHYSICAL EXAM:  GENERAL: NAD. Appears stated age. Temporal wasting.  HEENT: Anicteric, moist mucous membranes  CHEST/LUNG:  CTA b/l, no rales, wheezes, or rhonchi  HEART:  RRR, Normal S1, S2. No murmurs/rubs/gallop.  ABDOMEN:  BS+, soft, nontender, nondistended  EXTREMITIES: Ecchymoses throughout b/l upper and lower extremities. Left lower extremity has scaly/crusted appearance with erythematous dorsal foot and webspaces with broken skin. Purulent fluid in webspaces, tender to palpation. Bilateral lower extremities warm with bilateral DP and PT pulses difficult to appreciate.  Ischemic changes on both lower extremities. Stage 1 ulcer on left heel. No calf tenderness.  NERVOUS SYSTEM: AAOx3. Answers questions and follows commands appropriately      LABS:               9.4    12.22 )-----------( 258      ( 10 Sep 2021 08:30 )             28.5     CBC Full  -  ( 10 Sep 2021 08:30 )  WBC Count : 12.22 K/uL  RBC Count : 3.27 M/uL  Hemoglobin : 9.4 g/dL  Hematocrit : 28.5 %  Platelet Count - Automated : 258 K/uL  Mean Cell Volume : 87.2 fl  Mean Cell Hemoglobin : 28.7 pg  Mean Cell Hemoglobin Concentration : 33.0 gm/dL  Auto Neutrophil # : x  Auto Lymphocyte # : x  Auto Monocyte # : x  Auto Eosinophil # : x  Auto Basophil # : x  Auto Neutrophil % : x  Auto Lymphocyte % : x  Auto Monocyte % : x  Auto Eosinophil % : x  Auto Basophil % : x    141    |  109    |  43     ----------------------------<  82       ( 10 Sep 2021 08:30 )  3.4     |  26     |  1.20     Ca    8.4        10 Sep 2021 08:30    TPro  5.8    /  Alb  1.9    /  TBili  0.3    /  DBili  x      /  AST  22     /  ALT  17     /  AlkPhos  57     09 Sep 2021 09:58    LIVER FUNCTIONS - ( 09 Sep 2021 09:58 )  Alb: 1.9 g/dL / Pro: 5.8 g/dL / ALK PHOS: 57 U/L / ALT: 17 U/L / AST: 22 U/L / GGT: x           Culture - Abscess with Gram Stain (collected 07 Sep 2021 15:29)  Source: .Abscess left foot  Preliminary Report (09 Sep 2021 10:39):    Numerous Enterobacter cloacae complex Susceptibility to follow.    Numerous Pseudomonas aeruginosa    Numerous Proteus mirabilis    Normal skin joseline isolated  Organism: Pseudomonas aeruginosa  Proteus mirabilis (09 Sep 2021 10:39)  Organism: Proteus mirabilis (09 Sep 2021 10:39)  Organism: Pseudomonas aeruginosa (09 Sep 2021 10:38)    Culture - Other (collected 07 Sep 2021 03:16)  Source: .Other left foot wound  Preliminary Report (09 Sep 2021 11:06):    Numerous Pseudomonas aeruginosa Susceptibility to follow.    Moderate Proteus mirabilis    Culture - Blood (collected 07 Sep 2021 01:45)  Source: .Blood Blood  Preliminary Report (08 Sep 2021 02:02):    No growth to date.    Culture - Blood (collected 07 Sep 2021 01:43)  Source: .Blood Blood  Preliminary Report (08 Sep 2021 02:02):    No growth to date.      RADIOLOGY & ADDITIONAL TESTS:    EXAM:  XR FOOT COMP MIN 3 VIEWS LT                        PROCEDURE DATE:  09/06/2021    INTERPRETATION:  Ulcer left foot.    Advanced diffuse senile demineralization. Multiple hammertoe deformities. No fracture dislocation focal bone lysis or unusual periosteal reaction. Faint small vessel calcification. No soft tissue gas. Mild diffuse soft tissue edema    IMPRESSION: No acute or destructive osseous pathology. No plain film evidence of osteomyelitis.    --- End of Report ---  CONNOR CEDILLO MD; Attending Radiologist        EXAM:  DUPLEX LOW ARTERIES UNI LTD LT                        PROCEDURE DATE:  09/09/2021      INTERPRETATION:  US LOWER EXTREMITY ARTERIAL DUPLEX LIMITED LEFT  CLINICAL INFORMATION:  Peripheral artery disease of the left leg with pain and ulceration    COMPARISON: None  Real-time sonography of the left lower extremity arterial system was performed using a high-resolution linear array transducer and including color and spectral Doppler.  Diffuse calcified plaque throughout the leftleg. No soft tissue abnormalities are demonstrated in the left lower extremity. Flow phase patterns and peak systolic velocity measurements (in cm/s) were observed as follows:    LEFT:  CFA: Biphasic; 177  Proximal SFA: Biphasic; 99  Mid SFA: Biphasic; 46  Distal SFA: Biphasic;  100  Popliteal: Biphasic;  131  Anterior tibial: Tardus parvus; 65  Posterior tibial: Tardus parvus; 17  Peroneal: Occluded;  Dorsalis pedis: Tardus parvus;  71    IMPRESSION:  Diffuse calcific atherosclerosis throughout the left leg.  Focal stenoses in the common femoral artery and distal popliteal artery with diminished flow below the knee.  Peroneal artery is occluded.  Posterior tibial artery is nearly occluded.    --- End of Report ---  FABIOLA ROWE MD; Attending Radiologist        EXAM:  PHYSIOL EXTREM LOW 3+ LEV BI                        PROCEDURE DATE:  09/09/2021    INTERPRETATION:  Clinical Information: Peripheral vascular disease.    Technique: Bilateral lower extremity ABIs/PVR  Comparison: None    Findings/  Impression:  Right lower extremity: The ankle brachial index is 1.1. The pulse waveforms are diminished in the digits.    Left lower extremity: The ankle brachial index is 1.09. The pulse waveforms are normal. Limited segmental pressures on the left secondary to pain.    --- End of Report ---  FABIOLA ROWE MD; Attending Radiologist

## 2021-09-10 NOTE — PROGRESS NOTE ADULT - PROBLEM SELECTOR PLAN 1
- Pt has had increased redness and "oozing" per wound care nurse at [facility] the past 2-3 weeks and was started on outpatient course of Augmentin on 9/3 for 3x days with worsening wound appearance.  - Pt with history of foot wounds, past R heel wound required skin graft.  - Pt has new left heel ulcer, is padded to relieve pressure.  - Received Zosyn in ED and continued in the hospital - given outpatient choice not to cover MRSA - may have some data from OP podiatry.  - Start Cefepime 2g q12 and d/c Zosyn, per ID rec from wound cx results.   - Cont. Tylenol 650mg PRN for mild pain.  - Cont. wound care with silver alginate and conservative management, per Podiatry guidance.  - XRay of left foot was negative for osteomyelitis or any destructive osseous changes.  - MRI is not necessary but may be considered, per Podiatry rec.  - F/u blood cx - no growth to date.  - Wound cx - Numerous pseudomonas aeruginosa, enterobacter cloacae, and proteus mirabilis.  - MRSA PCR is negative.  - ID Dr. Gonzalez following.  - Podiatry (d/w with Dr. Zheng) following.  - Vascular Dr. Grover following. - Pt has had increased redness and "oozing" per wound care nurse at [facility] the past 2-3 weeks and was started on outpatient course of Augmentin on 9/3 for 3x days with worsening wound appearance.  - Pt with history of foot wounds, past R heel wound required skin graft.  - Pt has new left heel ulcer, is padded to relieve pressure.  - Received Zosyn in ED and continued in the hospital - given outpatient choice not to cover MRSA - may have some data from OP podiatry.  - Start Cefepime 2g q12 and d/c Zosyn, per ID rec from wound cx results.   - Cont. Tylenol 650mg PRN for mild pain.  - Cont. wound care with Aquacel Ag and dry sterile dressing every 2 days, per Podiatry guidance.  - [facility] Dr. Irizarry or Dr. Zheng within 1 week upon d/c.  - XRay of left foot was negative for osteomyelitis or any destructive osseous changes.  - MRI is not necessary but may be considered, per Podiatry rec.  - F/u blood cx - no growth to date.  - Wound cx - Numerous pseudomonas aeruginosa, enterobacter cloacae, and proteus mirabilis.  - MRSA PCR is negative.  - ID Dr. Gonzalez following.  - Podiatry (d/w with Dr. Zheng) following.  - Vascular Dr. Grover following. - Pt has had increased redness and "oozing" per wound care nurse at [facility] the past 2-3 weeks and was started on outpatient course of Augmentin on 9/3 for 3x days with worsening wound appearance.  - Pt with history of foot wounds, past R heel wound required skin graft.  - Pt has new left heel ulcer, is padded to relieve pressure.  -continue Cefepime 2g q12, s/p Zosyn  - Cont. Tylenol 650mg PRN for mild pain.  - Cont. wound care with Aquacel Ag and dry sterile dressing every 2 days, per Podiatry guidance.  - Pt is to [facility] with Dr. Irizarry or Dr. Zheng within 1 week upon d/c.  - XRay of left foot was negative for osteomyelitis or any destructive osseous changes.  -no plan on MRI per podiatry   - F/u blood cx - no growth to date.  - Wound cx - Numerous pseudomonas aeruginosa, enterobacter cloacae, and proteus mirabilis.  - MRSA PCR is negative.  - ID Dr. Gonzalez following.  - Podiatry (d/w with Dr. Zheng) following.  - Vascular Dr. Grover following.

## 2021-09-10 NOTE — PROGRESS NOTE ADULT - PROBLEM SELECTOR PLAN 4
- Pt presented initially with H/H 10.1/31.6, MCV 89.5, no documented history of anemia, pt denies bleeding.  - AM labs showed H/H stabilizing 9.2/28.9 (9/7) -> 8.8/26.5 (9/8) -> 9.3/27.9 (9/9 today)  - Iron low, Haptoglobin elevated, LDH slightly elevated, Ferritin & TIBC wnl. Folate Normal, B12 elevated.  - Iron started by Dr. Delatorre.  - F/u routine CBC.  - Heme consulted, f/u recs. - Pt presented initially with H/H 10.1/31.6, MCV 89.5, no documented history of anemia, pt denies bleeding.  - AM labs showed H/H stabilizing 9.2/28.9 (9/7) -> 8.8/26.5 (9/8) -> 9.3/27.9 (9/9) -> 9.4/28.5 (9/10 today)  - Iron low, Haptoglobin elevated, LDH slightly elevated, Ferritin & TIBC wnl. Folate Normal, B12 elevated.  - Iron started by Dr. Delatorre.  - F/u routine CBC.  - Heme consulted, f/u recs. -microcytic anemia, suspect iron deficiency anemia   - Pt presented initially with H/H 10.1/31.6, MCV 89.5, no documented history of anemia, pt denies bleeding.  - AM labs showed H/H stable   - Iron low, Haptoglobin elevated, LDH slightly elevated, Ferritin & TIBC wnl. Folate Normal, B12 elevated.  - s/p IV iron by Dr. Delatorre.  - F/u routine CBC.  - Heme consulted, f/u recs. Dr. Delatorre

## 2021-09-10 NOTE — GOALS OF CARE CONVERSATION - ADVANCED CARE PLANNING - CONVERSATION DETAILS
Writer met with pt.. Reviewed patient's medical and social history as well as events leading to patient's hospitalization. Writer discussed patient's current diagnosis (cellulitis lower extremity,PAD,NIHARIKA,foot wound,HTN,HLD,non ambulatory s/p right hip fx,no surgical intervention), medical condition and management,   prognosis, and life expectancy. Inquired about patient's wishes regarding extent of medical care to be provided including escalation of medical care into the ICU and use of vasopressor support. In addition, the writer inquired about thoughts regarding cardiopulmnary resuscitation, artificial nutrition and hydration including use of feeding tubes and IVF, antibiotics, and further investigative studies such as blood draws and radiology.Pt showed good insight into medical condition. All questions answered.Pt requested I speak to her dtr Flora regarding update. Called and spoke with Flora gave consent to update DNR. Patient consented to DNR status. MOLST form  in chart.  Psychosocial support provided.
Writer spoke with dtr/HCP Fer. Reviewed patient's medical and social history as well as events leading to patient's hospitalization. Writer discussed patient's current diagnosis (venous stasis ulcer l leg,CAD,HTN),  medical condition and management,  prognosis, and life expectancy. Inquired about patient's wishes regarding extent of medical care to be provided including escalation of medical care into the ICU and use of vasopressor support. In addition, the writer inquired about thoughts regarding cardiopulmnary resuscitation, artificial nutrition and hydration including use of feeding tubes and IVF, antibiotics, and further investigative studies such as blood draws and radiology.Fer  showed good  insight into medical condition. All questions answered. Discussed her mothers future needs,hospice at [place] is offered. Fer is aware and will look into for the future,not necessary now. Patient consented to DNR/DNI  status prior.. MOLST form in chart.  Psychosocial support provided.

## 2021-09-10 NOTE — PROGRESS NOTE ADULT - SUBJECTIVE AND OBJECTIVE BOX
[facility]  INFECTIOUS DISEASES   [address] [place] [address]  Tel: [phone]     Fax: [phone]  =======================================================    [id number]  NITA ZURITA     Follow up: Left foot infection     No new changes or overnight event. No fever. Mild leukocytosis.  Culture with proteus, pseudomonas, and enterobacter.     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 3.375 Gram(s) IV Intermittent every 8 hours     REVIEW OF SYSTEMS:  CONSTITUTIONAL:  No Fever or chills  HEENT:  No diplopia or blurred vision.  No sore throat or runny nose.  CARDIOVASCULAR:  No chest pain or SOB.  RESPIRATORY:  No cough, shortness of breath, PND or orthopnea.  GASTROINTESTINAL:  No nausea, vomiting or diarrhea.  GENITOURINARY:  No dysuria, frequency or urgency. No Blood in urine  MUSCULOSKELETAL:  no joint aches, no muscle pain  SKIN:  foot ulcer and pain   PSYCHIATRIC:  No disorder of thought or mood.  ENDOCRINE:  No heat or cold intolerance, polyuria or polydipsia.  HEMATOLOGICAL:  No easy bruising or bleeding.     Physical Exam:  Vital Signs Last 24 Hrs  T(C): 36.9 (09 Sep 2021 13:47), Max: 37.5 (09 Sep 2021 04:44)  T(F): 98.5 (09 Sep 2021 13:47), Max: 99.5 (09 Sep 2021 04:44)  HR: 68 (09 Sep 2021 13:47) (68 - 73)  BP: 173/90 (09 Sep 2021 13:47) (148/79 - 173/90)  BP(mean): --  RR: 17 (09 Sep 2021 13:47) (17 - 17)  SpO2: 94% (09 Sep 2021 13:47) (91% - 94%)  GEN: NAD  HEENT: normocephalic and atraumatic. EOMI. PERRL.    NECK: Supple.  No lymphadenopathy   LUNGS: Clear to auscultation.  HEART: Regular rate and rhythm   ABDOMEN: Soft, nontender, and nondistended.  Positive bowel sounds.    : No CVA tenderness  EXTREMITIES: Lower extremities with multiple crusted ulcers in lower legs, chronic skin changes, left foot with hammer toes in 2nd and 3rd toes with ischemic changes, between toes from 1 to 3rd there are some pus discharge that was cleaned   NEUROLOGIC: grossly intact.  PSYCHIATRIC: Appropriate affect .  SKIN: No rash    Labs:                        9.4    12.22 )-----------( 258      ( 10 Sep 2021 08:30 )             28.5     09-10    141  |  109<H>  |  43<H>  ----------------------------<  82  3.4<L>   |  26  |  1.20    Ca    8.4<L>      10 Sep 2021 08:30    TPro  5.8<L>  /  Alb  1.9<L>  /  TBili  0.3  /  DBili  x   /  AST  22  /  ALT  17  /  AlkPhos  57  09-09    Culture - Abscess with Gram Stain (collected 09-07-21 @ 15:29)  Source: .Abscess left foot  Organism: Pseudomonas aeruginosa  Proteus mirabilis (09-09-21 @ 10:39)  Organism: Proteus mirabilis (09-09-21 @ 10:39)    Sensitivities:      -  Amikacin: S <=16      -  Amoxicillin/Clavulanic Acid: S <=8/4      -  Ampicillin: R >16 These ampicillin results predict results for amoxicillin      -  Ampicillin/Sulbactam: R >16/8 Enterobacter, Citrobacter, and Serratia may develop resistance during prolonged therapy (3-4 days)      -  Aztreonam: S <=4      -  Cefazolin: R >16 Enterobacter, Citrobacter, and Serratia may develop resistance during prolonged therapy (3-4 days)      -  Cefepime: S <=2      -  Cefoxitin: S <=8      -  Ceftriaxone: S <=1 Enterobacter, Citrobacter, and Serratia may develop resistance during prolonged therapy      -  Ciprofloxacin: R 2      -  Ertapenem: S <=0.5      -  Gentamicin: S 4      -  Levofloxacin: R 4      -  Meropenem: S <=1      -  Piperacillin/Tazobactam: S <=8      -  Tobramycin: S <=2      -  Trimethoprim/Sulfamethoxazole: R >2/38      Method Type: JOVANNA  Organism: Pseudomonas aeruginosa (09-09-21 @ 10:38)    Sensitivities:      -  Amikacin: S <=16      -  Aztreonam: S <=4      -  Cefepime: S <=2      -  Ceftazidime: S <=1      -  Ciprofloxacin: S <=0.25      -  Gentamicin: S <=2      -  Imipenem: S <=1      -  Levofloxacin: S <=0.5      -  Meropenem: S <=1      -  Piperacillin/Tazobactam: S <=8      -  Tobramycin: S <=2      Method Type: JOVANNA    Culture - Other (collected 09-07-21 @ 03:16)  Source: .Other left foot wound    Culture - Blood (collected 09-07-21 @ 01:45)  Source: .Blood Blood    Culture - Blood (collected 09-07-21 @ 01:43)  Source: .Blood Blood    WBC Count: 12.22 K/uL (09-10-21 @ 08:30)  WBC Count: 13.10 K/uL (09-09-21 @ 09:58)  WBC Count: 10.78 K/uL (09-08-21 @ 07:33)  WBC Count: 11.72 K/uL (09-07-21 @ 08:45)  WBC Count: 11.07 K/uL (09-06-21 @ 15:10)    Creatinine, Serum: 1.20 mg/dL (09-10-21 @ 08:30)  Creatinine, Serum: 1.20 mg/dL (09-09-21 @ 09:58)  Creatinine, Serum: 1.20 mg/dL (09-08-21 @ 07:33)  Creatinine, Serum: 1.20 mg/dL (09-07-21 @ 08:45)  Creatinine, Serum: 1.60 mg/dL (09-06-21 @ 15:10)    C-Reactive Protein, Serum: 21 mg/L (09-07-21 @ 00:16)    Ferritin, Serum: 82 ng/mL (09-07-21 @ 12:17)    Sedimentation Rate, Erythrocyte: 53 mm/hr (09-06-21 @ 15:10)    COVID-19 PCR: NotDetec (09-06-21 @ 15:15)    All imaging and other data have been reviewed.  < from: Xray Foot AP + Lateral + Oblique, Left (09.06.21 @ 14:48) >  IMPRESSION: No acute or destructive osseous pathology. No plain film evidence of osteomyelitis.      Assessment and Plan:   89y Female, from [facility], with PMH of HTN, CAD, PAD, hypothyroidism, chronic wounds, was admitted with left foot wound.   Chronic skin changes with areas of open wounds and some pus discharge between toes, possibly main problem is the ischemia, for superficial ulcer can treat for possible cellulitis,   I wouldn't be very aggressive at this time.     1- Left foot wound and infection with ischemia    Recommendations:   - Blood culture NGTD  - Wound culture with pseudomonas, enterobacter and proteus S to cefepime  - Xray neg for OM, will proceed with MRI   - Vascular work up seems fine, vascular follow up  - Continue cefepime 2gm q12    - Based on goal of care will decide about the treatment.     Will follow.  Dr. Stubbs is covering this weekend.     Janet Gonzalez MD  Division of Infectious Diseases   Cell [phone] between 8am and 6pm   After 6pm and weekends please call ID service at [phone].

## 2021-09-10 NOTE — PROGRESS NOTE ADULT - PROBLEM SELECTOR PLAN 9
- c/w home synthroid 25mcg - Pt hyperglycemic in ED (glucose 123) without documented history of diabetes mellitus  - A1c was 5.7

## 2021-09-10 NOTE — PROGRESS NOTE ADULT - PROBLEM SELECTOR PLAN 7
- c/w home simvastatin 40mg - On losartan 100mg and atenolol 100mg at home  - Will continue atenolol with hold parameters. Resumed losartan.  - Monitor renal indices - On losartan 100mg and atenolol 100mg at home  - Will continue atenolol with hold parameters.

## 2021-09-10 NOTE — PROGRESS NOTE ADULT - PROBLEM SELECTOR PLAN 10
- Pt has history of HIT (heparin-induced thrombocytopenia) in past  - 2.5 mg BID of Eliquis while inpatient, per Heme rec.  - SCDs  - Heme consulted, f/u recs. - c/w home synthroid 25mcg

## 2021-09-10 NOTE — PROGRESS NOTE ADULT - PROBLEM SELECTOR PLAN 8
- Pt hyperglycemic in ED (glucose 123) without documented history of diabetes mellitus  - A1c was 5.7 - c/w home simvastatin 40mg

## 2021-09-10 NOTE — PROGRESS NOTE ADULT - SUBJECTIVE AND OBJECTIVE BOX
89y year old Female seen at [facility] 1EAS 104 D1 for ischemic changes to the left lower extremity and early gangrenous changes to the toes 1-5, particularly 2-3 on the left. The patient can not recall how long she has the wounds on her wounds on her left foot but states that her daughter knows. The patient states that she has been following up at the wound care center at [facility] with Dr. Smith. The patient is unable to recall what they have been doing to her wounds there. The patient also mentions that she had  an appointment today with Dr. Smith. The patient states that her left foot and leg is extremely sensitive to the touch along the LLE. The patient is currently residing at a nursing home. Denies any fever, chills, nausea, vomiting, chest pain, shortness of breath, or calf pain at this time.  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 3.375 Gram(s) IV Intermittent every 8 hours senna 2 Tablet(s) Oral at bedtime simvastatin 40 milliGRAM(s) Oral at bedtime  MEDICATIONS  (PRN): acetaminophen   Tablet .. 650 milliGRAM(s) Oral every 6 hours PRN Temp greater or equal to 38C (100.4F), Mild Pain (1 - 3) melatonin 3 milliGRAM(s) Oral at bedtime PRN Insomnia   Social History: Former smoker, "didn't inhale", from ages [age]  Denies EtOH Denies recreational drugs  Non-ambulatory, uses wheelchair Requires assistance with ADLs from [facility] (06 Sep 2021 19:12)   FAMILY HISTORY: Family history of breast cancer in mother (Mother)    Vital Signs Last 24 Hrs T(C): 36.6 (10 Sep 2021 05:07), Max: 37.2 (09 Sep 2021 21:17) T(F): 97.9 (10 Sep 2021 05:07), Max: 98.9 (09 Sep 2021 21:17) HR: 61 (10 Sep 2021 05:07) (61 - 67) BP: 112/56 (10 Sep 2021 05:07) (112/56 - 133/63) BP(mean): -- RR: 17 (10 Sep 2021 05:07) (17 - 17) SpO2: 94% (10 Sep 2021 05:07) (94% - 94%)  PHYSICAL EXAM: Vascular: DP & PT nonpalpable bilaterally, Capillary refill less than 4 or 5 seconds, No Digital hair present bilaterally, Diffuse erythema along the left lower extremity , skin temperature colder than within normal limit  Neurological: Gross epicritic sensation b/l  Musculoskeletal: 5/5 strength in all quadrants bilaterally, AJ & STJ ROM intact, pain and tenderness as well as sensitivity along the left lower extremity  Dermatological:  1.Gangrene of the left 2nd-4th digits  2. Ischemic changes along the left lower extremity  3. Dry scaly skin along the dorsal aspect of the left foot  4. Thicken, dystropic, shorten dystrophic nails 1-5 b/l    CBC Full  -  ( 10 Sep 2021 08:30 ) WBC Count : 12.22 K/uL RBC Count : 3.27 M/uL Hemoglobin : 9.4 g/dL Hematocrit : 28.5 % Platelet Count - Automated : 258 K/uL Mean Cell Volume : 87.2 fl Mean Cell Hemoglobin : 28.7 pg Mean Cell Hemoglobin Concentration : 33.0 gm/dL Auto Neutrophil # : x Auto Lymphocyte # : x Auto Monocyte # : x Auto Eosinophil # : x Auto Basophil # : x Auto Neutrophil % : x Auto Lymphocyte % : x Auto Monocyte % : x Auto Eosinophil % : x Auto Basophil % : x  ----------CHEM PANEL----------  09-10  141  |  109<H>  |  43<H> ----------------------------<  82 3.4<L>   |  26  |  1.20  Ca    8.4<L>      10 Sep 2021 08:30  TPro  5.8<L>  /  Alb  1.9<L>  /  TBili  0.3  /  DBili  x   /  AST  22  /  ALT  17  /  AlkPhos  57  09-09      Imaging:  EXAM: XR FOOT COMP MIN 3 VIEWS LT   PROCEDURE DATE: 09/06/2021    INTERPRETATION: Ulcer left foot.  3 views left foot.  Advanced diffuse senile demineralization. Multiple hammertoe deformities. No fracture dislocation focal bone lysis or unusual periosteal reaction. Faint small vessel calcification. No soft tissue gas. Mild diffuse soft tissue edema  IMPRESSION: No acute or destructive osseous pathology. No plain film evidence of osteomyelitis.  --- End of Report ---      CONNOR CEDILLO MD; Attending Radiologist This document has been electronically signed. Sep 6 2021 2:54PM Notes

## 2021-09-11 LAB
-  AMIKACIN: SIGNIFICANT CHANGE UP
-  AZTREONAM: SIGNIFICANT CHANGE UP
-  CEFEPIME: SIGNIFICANT CHANGE UP
-  CEFTAZIDIME: SIGNIFICANT CHANGE UP
-  CIPROFLOXACIN: SIGNIFICANT CHANGE UP
-  GENTAMICIN: SIGNIFICANT CHANGE UP
-  IMIPENEM: SIGNIFICANT CHANGE UP
-  LEVOFLOXACIN: SIGNIFICANT CHANGE UP
-  MEROPENEM: SIGNIFICANT CHANGE UP
-  PIPERACILLIN/TAZOBACTAM: SIGNIFICANT CHANGE UP
-  TOBRAMYCIN: SIGNIFICANT CHANGE UP
ANION GAP SERPL CALC-SCNC: 4 MMOL/L — LOW (ref 5–17)
BASOPHILS # BLD AUTO: 0.06 K/UL — SIGNIFICANT CHANGE UP (ref 0–0.2)
BASOPHILS NFR BLD AUTO: 0.4 % — SIGNIFICANT CHANGE UP (ref 0–2)
BUN SERPL-MCNC: 35 MG/DL — HIGH (ref 7–23)
CALCIUM SERPL-MCNC: 9 MG/DL — SIGNIFICANT CHANGE UP (ref 8.5–10.1)
CHLORIDE SERPL-SCNC: 110 MMOL/L — HIGH (ref 96–108)
CO2 SERPL-SCNC: 26 MMOL/L — SIGNIFICANT CHANGE UP (ref 22–31)
CREAT SERPL-MCNC: 1.1 MG/DL — SIGNIFICANT CHANGE UP (ref 0.5–1.3)
CULTURE RESULTS: SIGNIFICANT CHANGE UP
EOSINOPHIL # BLD AUTO: 0.28 K/UL — SIGNIFICANT CHANGE UP (ref 0–0.5)
EOSINOPHIL NFR BLD AUTO: 2.1 % — SIGNIFICANT CHANGE UP (ref 0–6)
GLUCOSE SERPL-MCNC: 107 MG/DL — HIGH (ref 70–99)
HCT VFR BLD CALC: 33.1 % — LOW (ref 34.5–45)
HGB BLD-MCNC: 10.6 G/DL — LOW (ref 11.5–15.5)
IMM GRANULOCYTES NFR BLD AUTO: 0.9 % — SIGNIFICANT CHANGE UP (ref 0–1.5)
LYMPHOCYTES # BLD AUTO: 0.75 K/UL — LOW (ref 1–3.3)
LYMPHOCYTES # BLD AUTO: 5.6 % — LOW (ref 13–44)
MAGNESIUM SERPL-MCNC: 2.2 MG/DL — SIGNIFICANT CHANGE UP (ref 1.6–2.6)
MCHC RBC-ENTMCNC: 28.5 PG — SIGNIFICANT CHANGE UP (ref 27–34)
MCHC RBC-ENTMCNC: 32 GM/DL — SIGNIFICANT CHANGE UP (ref 32–36)
MCV RBC AUTO: 89 FL — SIGNIFICANT CHANGE UP (ref 80–100)
METHOD TYPE: SIGNIFICANT CHANGE UP
MONOCYTES # BLD AUTO: 0.7 K/UL — SIGNIFICANT CHANGE UP (ref 0–0.9)
MONOCYTES NFR BLD AUTO: 5.2 % — SIGNIFICANT CHANGE UP (ref 2–14)
NEUTROPHILS # BLD AUTO: 11.55 K/UL — HIGH (ref 1.8–7.4)
NEUTROPHILS NFR BLD AUTO: 85.8 % — HIGH (ref 43–77)
NRBC # BLD: 0 /100 WBCS — SIGNIFICANT CHANGE UP (ref 0–0)
ORGANISM # SPEC MICROSCOPIC CNT: SIGNIFICANT CHANGE UP
PLATELET # BLD AUTO: 319 K/UL — SIGNIFICANT CHANGE UP (ref 150–400)
POTASSIUM SERPL-MCNC: 4.3 MMOL/L — SIGNIFICANT CHANGE UP (ref 3.5–5.3)
POTASSIUM SERPL-SCNC: 4.3 MMOL/L — SIGNIFICANT CHANGE UP (ref 3.5–5.3)
RBC # BLD: 3.72 M/UL — LOW (ref 3.8–5.2)
RBC # FLD: 15.7 % — HIGH (ref 10.3–14.5)
SODIUM SERPL-SCNC: 140 MMOL/L — SIGNIFICANT CHANGE UP (ref 135–145)
SPECIMEN SOURCE: SIGNIFICANT CHANGE UP
WBC # BLD: 13.46 K/UL — HIGH (ref 3.8–10.5)
WBC # FLD AUTO: 13.46 K/UL — HIGH (ref 3.8–10.5)

## 2021-09-11 PROCEDURE — 99233 SBSQ HOSP IP/OBS HIGH 50: CPT

## 2021-09-11 PROCEDURE — 99232 SBSQ HOSP IP/OBS MODERATE 35: CPT

## 2021-09-11 RX ADMIN — Medication 81 MILLIGRAM(S): at 14:23

## 2021-09-11 RX ADMIN — APIXABAN 2.5 MILLIGRAM(S): 2.5 TABLET, FILM COATED ORAL at 18:26

## 2021-09-11 RX ADMIN — SENNA PLUS 2 TABLET(S): 8.6 TABLET ORAL at 22:06

## 2021-09-11 RX ADMIN — APIXABAN 2.5 MILLIGRAM(S): 2.5 TABLET, FILM COATED ORAL at 05:56

## 2021-09-11 RX ADMIN — SIMVASTATIN 40 MILLIGRAM(S): 20 TABLET, FILM COATED ORAL at 22:06

## 2021-09-11 RX ADMIN — Medication 20 MILLIGRAM(S): at 05:56

## 2021-09-11 RX ADMIN — CEFEPIME 100 MILLIGRAM(S): 1 INJECTION, POWDER, FOR SOLUTION INTRAMUSCULAR; INTRAVENOUS at 06:55

## 2021-09-11 RX ADMIN — Medication 10 MILLIGRAM(S): at 22:06

## 2021-09-11 RX ADMIN — Medication 3 MILLIGRAM(S): at 22:06

## 2021-09-11 RX ADMIN — Medication 10 MILLIGRAM(S): at 14:23

## 2021-09-11 RX ADMIN — CLOPIDOGREL BISULFATE 75 MILLIGRAM(S): 75 TABLET, FILM COATED ORAL at 14:23

## 2021-09-11 RX ADMIN — Medication 10 MILLIGRAM(S): at 05:57

## 2021-09-11 RX ADMIN — LOSARTAN POTASSIUM 100 MILLIGRAM(S): 100 TABLET, FILM COATED ORAL at 05:56

## 2021-09-11 RX ADMIN — CEFEPIME 100 MILLIGRAM(S): 1 INJECTION, POWDER, FOR SOLUTION INTRAMUSCULAR; INTRAVENOUS at 18:26

## 2021-09-11 RX ADMIN — ATENOLOL 100 MILLIGRAM(S): 25 TABLET ORAL at 05:57

## 2021-09-11 RX ADMIN — Medication 25 MICROGRAM(S): at 05:56

## 2021-09-11 NOTE — PROGRESS NOTE ADULT - ATTENDING COMMENTS
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 On IV cefepime. Family  wants medical management and not in favour of surgical intervention. Local wound care as per Podiatry. Plan as above.
89F with PMHx CAD, HTN, HLD, hypothyroidism, chronic foot wounds, from [facility], sent in for L foot wound that has failed outpatient antibiotic therapy, admitting for IV abx treatment of left foot cellulitis. Plan: apprec palliative consult, monitor clinical course, cont iv antibx, apprec ID recs, f/u cultures, apprec podiatry recs, PT eval
89F with PMHx CAD, HTN, HLD, hypothyroidism, chronic foot wounds, from 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 On IV Zosyn. Vascular w/u in progress. Patient with multiple co morbidities and advanced age, palliative care consulted for possible home hospice arrangements if patient /family agreeable. Wound care per podiatry

## 2021-09-11 NOTE — PROGRESS NOTE ADULT - ASSESSMENT
1.Gangrene of the left 2nd-4th digits   2. Ischemic changes along the left lower extremity   3. Dry scaly skin along the dorsal aspect of the left foot   4. Thicken, dystropic, shorten dystrophic nails 1-5 b/l     Clinically, patient appears to have peripheral arterial disease     Follow up with the vascular consultation and recommendations and studies     Podiatry team will continue to follow patient while in house      1.Gangrene of the left 2nd-4th digits   2. Ischemic changes along the left lower extremity   3. Dry scaly skin along the dorsal aspect of the left foot   4. Thicken, dystropic, shorten dystrophic nails 1-5 b/l     Ordered MRI to r/o OM because WBC trending up     Clinically, patient appears to have peripheral arterial disease     Follow up with the vascular consultation and recommendations and studies     Podiatry team will continue to follow patient while in house

## 2021-09-11 NOTE — PROGRESS NOTE ADULT - PROBLEM SELECTOR PLAN 2
Pt with poor pulses and hx of PAD/poor circulation.  - Continue asa, plavix and statin.  - VA duplex LLE showed diffuse calcified atherosclerosis, stenoses in common femoral and distal popliteal a. with decreased flow below knee, peroneal a. occluded, and posterior tibial a. nearly occluded.  - VA physio of b/l lower extremities showed right LAI of 1.1 with decreased pulse wave in digits and left LAI of 1.09 with normal pulse wave.  - Vascular following, recs appreciated.

## 2021-09-11 NOTE — PROGRESS NOTE ADULT - PROBLEM SELECTOR PLAN 1
- Pt has had increased redness and "oozing" per wound care nurse at [facility] the past 2-3 weeks and was started on outpatient course of Augmentin on 9/3 for 3x days with worsening wound appearance.  - Hx of foot wounds, past R heel wound required skin graft. New left heel ulcer, is padded to relieve pressure.  - F/u blood cx - NGTD.  - Wound cx - Numerous pseudomonas aeruginosa, enterobacter cloacae, and proteus mirabilis.  - MRSA PCR is negative.  - Continue Cefepime 2g q12, s/p Zosyn  - Tylenol 650mg PRN for mild pain.  - Cont. wound care with Aquacel Ag and dry sterile dressing every 2 days, per Podiatry guidance.  - Pt is to [facility] with Dr. Irizarry or Dr. Zheng within 1 week upon d/c.  - XRay L Foot negative for osteomyelitis or any destructive osseous changes.  -no plan on MRI per podiatry   - ID Dr. Gonzalez following.  - Podiatry (d/w with Dr. Zheng) following.  - Vascular Dr. Grover following.  - Plan for Dc monday

## 2021-09-11 NOTE — PROGRESS NOTE ADULT - ASSESSMENT
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

## 2021-09-11 NOTE — PROGRESS NOTE ADULT - SUBJECTIVE AND OBJECTIVE BOX
Patient is a 89y old  Female who presents with a chief complaint of L foot wound (10 Sep 2021 13:52)      INTERVAL HPI/OVERNIGHT EVENTS: Patient seen and examined at bedside. No overnight events occurred. Pt reports yesterday she had 3 loose bowel movements. This morning she reports tenderness to palpation at the LLE but at rest denies any pain. Denies fevers, chills, headache, lightheadedness, chest pain, dyspnea, abdominal pain, n/v.    MEDICATIONS  (STANDING):  apixaban 2.5 milliGRAM(s) Oral two times a day  aspirin enteric coated 81 milliGRAM(s) Oral daily  ATENolol  Tablet 100 milliGRAM(s) Oral daily  cefepime   IVPB 2000 milliGRAM(s) IV Intermittent every 12 hours  clopidogrel Tablet 75 milliGRAM(s) Oral daily  furosemide    Tablet 20 milliGRAM(s) Oral daily  hydrALAZINE 10 milliGRAM(s) Oral every 8 hours  influenza   Vaccine 0.5 milliLiter(s) IntraMuscular once  levothyroxine 25 MICROGram(s) Oral daily  losartan 100 milliGRAM(s) Oral daily  senna 2 Tablet(s) Oral at bedtime  simvastatin 40 milliGRAM(s) Oral at bedtime    MEDICATIONS  (PRN):  acetaminophen   Tablet .. 650 milliGRAM(s) Oral every 6 hours PRN Temp greater or equal to 38C (100.4F), Mild Pain (1 - 3)  melatonin 3 milliGRAM(s) Oral at bedtime PRN Insomnia      Allergies    heparin (Other)    Intolerances        REVIEW OF SYSTEMS:  Comprehensive ROS as per HPI.    Vital Signs Last 24 Hrs  T(C): 36.5 (11 Sep 2021 04:22), Max: 36.8 (10 Sep 2021 19:59)  T(F): 97.7 (11 Sep 2021 04:22), Max: 98.2 (10 Sep 2021 19:59)  HR: 63 (11 Sep 2021 04:22) (63 - 73)  BP: 151/54 (11 Sep 2021 04:22) (122/68 - 161/66)  BP(mean): --  RR: 17 (11 Sep 2021 04:22) (17 - 17)  SpO2: 94% (11 Sep 2021 04:22) (94% - 95%)    PHYSICAL EXAM:  GENERAL: NAD  HEENT:  EOMI. Anicteric, moist mucous membranes  CHEST/LUNG:  CTA b/l, no rales, wheezes, or rhonchi  HEART:  RRR, S1, S2  ABDOMEN:  BS+, soft, nontender, nondistended  EXTREMITIES: Ecchymoses throughout b/l upper and lower extremities. Left lower extremity has scaly/crusted appearance with erythematous dorsal foot and webspaces with broken skin. Purulent fluid in webspaces, tender to palpation. BLE warm with bilateral DP and PT pulses difficult to appreciate. No calf tenderness.  NERVOUS SYSTEM: AAOx3. Answers questions and follows commands appropriately    LABS:                        10.6   13.46 )-----------( 319      ( 11 Sep 2021 09:16 )             33.1     CBC Full  -  ( 11 Sep 2021 09:16 )  WBC Count : 13.46 K/uL  Hemoglobin : 10.6 g/dL  Hematocrit : 33.1 %  Platelet Count - Automated : 319 K/uL  Mean Cell Volume : 89.0 fl  Mean Cell Hemoglobin : 28.5 pg  Mean Cell Hemoglobin Concentration : 32.0 gm/dL  Auto Neutrophil # : 11.55 K/uL  Auto Lymphocyte # : 0.75 K/uL  Auto Monocyte # : 0.70 K/uL  Auto Eosinophil # : 0.28 K/uL  Auto Basophil # : 0.06 K/uL  Auto Neutrophil % : 85.8 %  Auto Lymphocyte % : 5.6 %  Auto Monocyte % : 5.2 %  Auto Eosinophil % : 2.1 %  Auto Basophil % : 0.4 %    11 Sep 2021 09:16    140    |  110    |  35     ----------------------------<  107    4.3     |  26     |  1.10     Ca    9.0        11 Sep 2021 09:16  Mg     2.2       11 Sep 2021 09:16          CAPILLARY BLOOD GLUCOSE            Culture - Abscess with Gram Stain (collected 09-07-21 @ 15:29)  Source: .Abscess left foot  Preliminary Report (09-09-21 @ 10:39):    Numerous Enterobacter cloacae complex Susceptibility to follow.    Numerous Pseudomonas aeruginosa    Numerous Proteus mirabilis    Normal skin joseline isolated  Organism: Pseudomonas aeruginosa  Proteus mirabilis (09-09-21 @ 10:39)  Organism: Proteus mirabilis (09-09-21 @ 10:39)      -  Amikacin: S <=16      -  Amoxicillin/Clavulanic Acid: S <=8/4      -  Ampicillin: R >16 These ampicillin results predict results for amoxicillin      -  Ampicillin/Sulbactam: R >16/8 Enterobacter, Citrobacter, and Serratia may develop resistance during prolonged therapy (3-4 days)      -  Aztreonam: S <=4      -  Cefazolin: R >16 Enterobacter, Citrobacter, and Serratia may develop resistance during prolonged therapy (3-4 days)      -  Cefepime: S <=2      -  Cefoxitin: S <=8      -  Ceftriaxone: S <=1 Enterobacter, Citrobacter, and Serratia may develop resistance during prolonged therapy      -  Ciprofloxacin: R 2      -  Ertapenem: S <=0.5      -  Gentamicin: S 4      -  Levofloxacin: R 4      -  Meropenem: S <=1      -  Piperacillin/Tazobactam: S <=8      -  Tobramycin: S <=2      -  Trimethoprim/Sulfamethoxazole: R >2/38      Method Type: JOVANNA  Organism: Pseudomonas aeruginosa (09-09-21 @ 10:38)      -  Amikacin: S <=16      -  Aztreonam: S <=4      -  Cefepime: S <=2      -  Ceftazidime: S <=1      -  Ciprofloxacin: S <=0.25      -  Gentamicin: S <=2      -  Imipenem: S <=1      -  Levofloxacin: S <=0.5      -  Meropenem: S <=1      -  Piperacillin/Tazobactam: S <=8      -  Tobramycin: S <=2      Method Type: JOVANNA    Culture - Other (collected 09-07-21 @ 03:16)  Source: .Other left foot wound  Preliminary Report (09-09-21 @ 11:06):    Numerous Pseudomonas aeruginosa Susceptibility to follow.    Moderate Proteus mirabilis  Organism: Proteus mirabilis (09-10-21 @ 14:52)  Organism: Proteus mirabilis (09-10-21 @ 14:52)      -  Amikacin: S <=16      -  Amoxicillin/Clavulanic Acid: S <=8/4      -  Ampicillin: R >16 These ampicillin results predict results for amoxicillin      -  Ampicillin/Sulbactam: R >16/8 Enterobacter, Citrobacter, and Serratia may develop resistance during prolonged therapy (3-4 days)      -  Aztreonam: S <=4      -  Cefazolin: R >16 Enterobacter, Citrobacter, and Serratia may develop resistance during prolonged therapy (3-4 days)      -  Cefepime: S <=2      -  Cefoxitin: S <=8      -  Ceftriaxone: S <=1 Enterobacter, Citrobacter, and Serratia may develop resistance during prolonged therapy      -  Ciprofloxacin: R 2      -  Ertapenem: S <=0.5      -  Gentamicin: S <=2      -  Levofloxacin: R 4      -  Meropenem: S <=1      -  Piperacillin/Tazobactam: S <=8      -  Tobramycin: S <=2      -  Trimethoprim/Sulfamethoxazole: R >2/38      Method Type: JOVANNA    Culture - Blood (collected 09-07-21 @ 01:45)  Source: .Blood Blood  Preliminary Report (09-08-21 @ 02:02):    No growth to date.    Culture - Blood (collected 09-07-21 @ 01:43)  Source: .Blood Blood  Preliminary Report (09-08-21 @ 02:02):    No growth to date.        RADIOLOGY & ADDITIONAL TESTS:    Personally reviewed.     Consultant(s) Notes Reviewed:  [x] YES  [ ] NO

## 2021-09-11 NOTE — PROGRESS NOTE ADULT - PROBLEM SELECTOR PLAN 3
- Pt presented initially with BUN 54 Cr 1.60, unknown baseline Cr but was ~1.36 8/2021.  - Likely mild prerenal azotemia on CKD 3; received NS bolus 1L x1 in ED.  - Now resolved   - Monitor renal function

## 2021-09-11 NOTE — PROGRESS NOTE ADULT - SUBJECTIVE AND OBJECTIVE BOX
89y year old Female seen at [facility] 1EAS 104 D1 for ischemic changes to the left lower extremity and early gangrenous changes to the toes 1-5, particularly 2-3 on the left. The patient can not recall how long she has the wounds on her wounds on her left foot but states that her daughter knows. The patient states that she has been following up at the wound care center at [facility] with Dr. Smith. The patient is unable to recall what they have been doing to her wounds there. The patient also mentions that she had  an appointment today with Dr. Smith. The patient states that her left foot and leg is extremely sensitive to the touch along the LLE. The patient is currently residing at a nursing home. Denies any fever, chills, nausea, vomiting, chest pain, shortness of breath, or calf pain at this time.  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 3.375 Gram(s) IV Intermittent every 8 hours senna 2 Tablet(s) Oral at bedtime simvastatin 40 milliGRAM(s) Oral at bedtime  MEDICATIONS  (PRN): acetaminophen   Tablet .. 650 milliGRAM(s) Oral every 6 hours PRN Temp greater or equal to 38C (100.4F), Mild Pain (1 - 3) melatonin 3 milliGRAM(s) Oral at bedtime PRN Insomnia   Social History: Former smoker, "didn't inhale", from ages [age]  Denies EtOH Denies recreational drugs  Non-ambulatory, uses wheelchair Requires assistance with ADLs from [facility] (06 Sep 2021 19:12)   FAMILY HISTORY: Family history of breast cancer in mother (Mother)    Vital Signs Last 24 Hrs T(C): 36.7 (11 Sep 2021 14:01), Max: 36.8 (10 Sep 2021 19:59) T(F): 98.1 (11 Sep 2021 14:01), Max: 98.2 (10 Sep 2021 19:59) HR: 66 (11 Sep 2021 14:01) (63 - 73) BP: 153/67 (11 Sep 2021 14:01) (122/68 - 153/67) BP(mean): -- RR: 17 (11 Sep 2021 14:01) (17 - 17) SpO2: 95% (11 Sep 2021 14:01) (94% - 95%)  PHYSICAL EXAM: Vascular: DP & PT nonpalpable bilaterally, Capillary refill less than 4 or 5 seconds, No Digital hair present bilaterally, Diffuse erythema along the left lower extremity , skin temperature colder than within normal limit  Neurological: Gross epicritic sensation b/l  Musculoskeletal: 5/5 strength in all quadrants bilaterally, AJ & STJ ROM intact, pain and tenderness as well as sensitivity along the left lower extremity  Dermatological:  1.Gangrene of the left 2nd-4th digits  2. Ischemic changes along the left lower extremity  3. Dry scaly skin along the dorsal aspect of the left foot  4. Thicken, dystropic, shorten dystrophic nails 1-5 b/l    CBC Full  -  ( 11 Sep 2021 09:16 ) WBC Count : 13.46 K/uL RBC Count : 3.72 M/uL Hemoglobin : 10.6 g/dL Hematocrit : 33.1 % Platelet Count - Automated : 319 K/uL Mean Cell Volume : 89.0 fl Mean Cell Hemoglobin : 28.5 pg Mean Cell Hemoglobin Concentration : 32.0 gm/dL Auto Neutrophil # : 11.55 K/uL Auto Lymphocyte # : 0.75 K/uL Auto Monocyte # : 0.70 K/uL Auto Eosinophil # : 0.28 K/uL Auto Basophil # : 0.06 K/uL Auto Neutrophil % : 85.8 % Auto Lymphocyte % : 5.6 % Auto Monocyte % : 5.2 % Auto Eosinophil % : 2.1 % Auto Basophil % : 0.4 %   ----------CHEM PANEL----------  09-11  140  |  110<H>  |  35<H> ----------------------------<  107<H> 4.3   |  26  |  1.10  Ca    9.0      11 Sep 2021 09:16 Mg     2.2     09-11        Imaging:  EXAM: XR FOOT COMP MIN 3 VIEWS LT   PROCEDURE DATE: 09/06/2021    INTERPRETATION: Ulcer left foot.  3 views left foot.  Advanced diffuse senile demineralization. Multiple hammertoe deformities. No fracture dislocation focal bone lysis or unusual periosteal reaction. Faint small vessel calcification. No soft tissue gas. Mild diffuse soft tissue edema  IMPRESSION: No acute or destructive osseous pathology. No plain film evidence of osteomyelitis.  --- End of Report ---      CONNOR CEDILLO MD; Attending Radiologist This document has been electronically signed. Sep 6 2021 2:54PM Notes

## 2021-09-11 NOTE — PROGRESS NOTE ADULT - PROBLEM SELECTOR PLAN 4
-microcytic anemia, suspect iron deficiency anemia   - Pt presented initially with H/H 10.1/31.6, MCV 89.5, no documented history of anemia, pt denies bleeding.  - AM labs showed H/H stable   - Iron low, Haptoglobin elevated, LDH slightly elevated, Ferritin & TIBC wnl. Folate Normal, B12 elevated.  - s/p IV iron by Dr. Delatorre.  - F/u routine CBC.  - Heme consulted, f/u recs. Dr. Delatorre

## 2021-09-12 LAB
-  AMIKACIN: SIGNIFICANT CHANGE UP
-  AMOXICILLIN/CLAVULANIC ACID: SIGNIFICANT CHANGE UP
-  AMPICILLIN/SULBACTAM: SIGNIFICANT CHANGE UP
-  AMPICILLIN: SIGNIFICANT CHANGE UP
-  AZTREONAM: SIGNIFICANT CHANGE UP
-  CEFAZOLIN: SIGNIFICANT CHANGE UP
-  CEFEPIME: SIGNIFICANT CHANGE UP
-  CEFOXITIN: SIGNIFICANT CHANGE UP
-  CEFTRIAXONE: SIGNIFICANT CHANGE UP
-  CIPROFLOXACIN: SIGNIFICANT CHANGE UP
-  ERTAPENEM: SIGNIFICANT CHANGE UP
-  GENTAMICIN: SIGNIFICANT CHANGE UP
-  IMIPENEM: SIGNIFICANT CHANGE UP
-  LEVOFLOXACIN: SIGNIFICANT CHANGE UP
-  MEROPENEM: SIGNIFICANT CHANGE UP
-  PIPERACILLIN/TAZOBACTAM: SIGNIFICANT CHANGE UP
-  TOBRAMYCIN: SIGNIFICANT CHANGE UP
-  TRIMETHOPRIM/SULFAMETHOXAZOLE: SIGNIFICANT CHANGE UP
ALBUMIN SERPL ELPH-MCNC: 1.8 G/DL — LOW (ref 3.3–5)
ALP SERPL-CCNC: 55 U/L — SIGNIFICANT CHANGE UP (ref 40–120)
ALT FLD-CCNC: 16 U/L — SIGNIFICANT CHANGE UP (ref 12–78)
ANION GAP SERPL CALC-SCNC: 7 MMOL/L — SIGNIFICANT CHANGE UP (ref 5–17)
AST SERPL-CCNC: 15 U/L — SIGNIFICANT CHANGE UP (ref 15–37)
BASOPHILS # BLD AUTO: 0.04 K/UL — SIGNIFICANT CHANGE UP (ref 0–0.2)
BASOPHILS NFR BLD AUTO: 0.3 % — SIGNIFICANT CHANGE UP (ref 0–2)
BILIRUB SERPL-MCNC: 0.3 MG/DL — SIGNIFICANT CHANGE UP (ref 0.2–1.2)
BUN SERPL-MCNC: 37 MG/DL — HIGH (ref 7–23)
CALCIUM SERPL-MCNC: 8.4 MG/DL — LOW (ref 8.5–10.1)
CHLORIDE SERPL-SCNC: 111 MMOL/L — HIGH (ref 96–108)
CO2 SERPL-SCNC: 22 MMOL/L — SIGNIFICANT CHANGE UP (ref 22–31)
CREAT SERPL-MCNC: 1 MG/DL — SIGNIFICANT CHANGE UP (ref 0.5–1.3)
CULTURE RESULTS: SIGNIFICANT CHANGE UP
EOSINOPHIL # BLD AUTO: 0.34 K/UL — SIGNIFICANT CHANGE UP (ref 0–0.5)
EOSINOPHIL NFR BLD AUTO: 2.6 % — SIGNIFICANT CHANGE UP (ref 0–6)
GLUCOSE SERPL-MCNC: 85 MG/DL — SIGNIFICANT CHANGE UP (ref 70–99)
HCT VFR BLD CALC: 27.8 % — LOW (ref 34.5–45)
HGB BLD-MCNC: 9.1 G/DL — LOW (ref 11.5–15.5)
IMM GRANULOCYTES NFR BLD AUTO: 1 % — SIGNIFICANT CHANGE UP (ref 0–1.5)
LYMPHOCYTES # BLD AUTO: 0.9 K/UL — LOW (ref 1–3.3)
LYMPHOCYTES # BLD AUTO: 7 % — LOW (ref 13–44)
MCHC RBC-ENTMCNC: 28.6 PG — SIGNIFICANT CHANGE UP (ref 27–34)
MCHC RBC-ENTMCNC: 32.7 GM/DL — SIGNIFICANT CHANGE UP (ref 32–36)
MCV RBC AUTO: 87.4 FL — SIGNIFICANT CHANGE UP (ref 80–100)
METHOD TYPE: SIGNIFICANT CHANGE UP
MONOCYTES # BLD AUTO: 0.94 K/UL — HIGH (ref 0–0.9)
MONOCYTES NFR BLD AUTO: 7.3 % — SIGNIFICANT CHANGE UP (ref 2–14)
NEUTROPHILS # BLD AUTO: 10.55 K/UL — HIGH (ref 1.8–7.4)
NEUTROPHILS NFR BLD AUTO: 81.8 % — HIGH (ref 43–77)
NRBC # BLD: 0 /100 WBCS — SIGNIFICANT CHANGE UP (ref 0–0)
ORGANISM # SPEC MICROSCOPIC CNT: SIGNIFICANT CHANGE UP
PLATELET # BLD AUTO: 335 K/UL — SIGNIFICANT CHANGE UP (ref 150–400)
POTASSIUM SERPL-MCNC: 4 MMOL/L — SIGNIFICANT CHANGE UP (ref 3.5–5.3)
POTASSIUM SERPL-SCNC: 4 MMOL/L — SIGNIFICANT CHANGE UP (ref 3.5–5.3)
PROT SERPL-MCNC: 6 G/DL — SIGNIFICANT CHANGE UP (ref 6–8.3)
RBC # BLD: 3.18 M/UL — LOW (ref 3.8–5.2)
RBC # FLD: 15.3 % — HIGH (ref 10.3–14.5)
SODIUM SERPL-SCNC: 140 MMOL/L — SIGNIFICANT CHANGE UP (ref 135–145)
SPECIMEN SOURCE: SIGNIFICANT CHANGE UP
WBC # BLD: 12.9 K/UL — HIGH (ref 3.8–10.5)
WBC # FLD AUTO: 12.9 K/UL — HIGH (ref 3.8–10.5)

## 2021-09-12 PROCEDURE — 99233 SBSQ HOSP IP/OBS HIGH 50: CPT

## 2021-09-12 RX ADMIN — Medication 81 MILLIGRAM(S): at 13:15

## 2021-09-12 RX ADMIN — CEFEPIME 100 MILLIGRAM(S): 1 INJECTION, POWDER, FOR SOLUTION INTRAMUSCULAR; INTRAVENOUS at 05:19

## 2021-09-12 RX ADMIN — ATENOLOL 100 MILLIGRAM(S): 25 TABLET ORAL at 05:19

## 2021-09-12 RX ADMIN — Medication 650 MILLIGRAM(S): at 21:30

## 2021-09-12 RX ADMIN — Medication 10 MILLIGRAM(S): at 05:19

## 2021-09-12 RX ADMIN — Medication 25 MICROGRAM(S): at 05:19

## 2021-09-12 RX ADMIN — Medication 20 MILLIGRAM(S): at 05:19

## 2021-09-12 RX ADMIN — SENNA PLUS 2 TABLET(S): 8.6 TABLET ORAL at 21:28

## 2021-09-12 RX ADMIN — CEFEPIME 100 MILLIGRAM(S): 1 INJECTION, POWDER, FOR SOLUTION INTRAMUSCULAR; INTRAVENOUS at 18:19

## 2021-09-12 RX ADMIN — APIXABAN 2.5 MILLIGRAM(S): 2.5 TABLET, FILM COATED ORAL at 18:19

## 2021-09-12 RX ADMIN — CLOPIDOGREL BISULFATE 75 MILLIGRAM(S): 75 TABLET, FILM COATED ORAL at 13:16

## 2021-09-12 RX ADMIN — Medication 650 MILLIGRAM(S): at 22:00

## 2021-09-12 RX ADMIN — APIXABAN 2.5 MILLIGRAM(S): 2.5 TABLET, FILM COATED ORAL at 05:19

## 2021-09-12 RX ADMIN — SIMVASTATIN 40 MILLIGRAM(S): 20 TABLET, FILM COATED ORAL at 21:28

## 2021-09-12 RX ADMIN — LOSARTAN POTASSIUM 100 MILLIGRAM(S): 100 TABLET, FILM COATED ORAL at 05:19

## 2021-09-12 RX ADMIN — Medication 10 MILLIGRAM(S): at 21:27

## 2021-09-12 RX ADMIN — Medication 10 MILLIGRAM(S): at 13:21

## 2021-09-12 NOTE — PROGRESS NOTE ADULT - PROBLEM SELECTOR PLAN 1
- Pt has had increased redness and "oozing" per wound care nurse at [facility] the past 2-3 weeks and was started on outpatient course of Augmentin on 9/3 for 3x days with worsening wound appearance.  - Hx of foot wounds, past R heel wound required skin graft. New left heel ulcer, is padded to relieve pressure.  - Blood cx - NGTD.  - Wound cx - Numerous pseudomonas aeruginosa, enterobacter cloacae, and proteus mirabilis.  - MRSA PCR is negative.  - Continue Cefepime 2g q12, s/p Zosyn  - Tylenol 650mg PRN for mild pain.  - Cont. wound care with Aquacel Ag and dry sterile dressing every 2 days, per Podiatry guidance.  - Pt [facility] with Dr. Irizarry or Dr. Zheng within 1 week upon d/c.  - XRay L Foot negative for osteomyelitis or any destructive osseous changes.  -no plan on MRI per podiatry   - ID Dr. Gonzalez following.  - Podiatry (d/w with Dr. Zheng) following.  - Vascular Dr. Grover following.  - Plan for Dc monday

## 2021-09-12 NOTE — PROGRESS NOTE ADULT - SUBJECTIVE AND OBJECTIVE BOX
Patient is a 89y old  Female who presents with a chief complaint of L foot wound (11 Sep 2021 16:10)      INTERVAL HPI/OVERNIGHT EVENTS: Patient seen and examined at bedside. No overnight events occurred. Patient has complains of pain at the LLE. Denies fevers, chills, headache, lightheadedness, chest pain, dyspnea, abdominal pain, n/v/d/c.    MEDICATIONS  (STANDING):  apixaban 2.5 milliGRAM(s) Oral two times a day  aspirin enteric coated 81 milliGRAM(s) Oral daily  ATENolol  Tablet 100 milliGRAM(s) Oral daily  cefepime   IVPB 2000 milliGRAM(s) IV Intermittent every 12 hours  clopidogrel Tablet 75 milliGRAM(s) Oral daily  furosemide    Tablet 20 milliGRAM(s) Oral daily  hydrALAZINE 10 milliGRAM(s) Oral every 8 hours  influenza   Vaccine 0.5 milliLiter(s) IntraMuscular once  levothyroxine 25 MICROGram(s) Oral daily  losartan 100 milliGRAM(s) Oral daily  senna 2 Tablet(s) Oral at bedtime  simvastatin 40 milliGRAM(s) Oral at bedtime    MEDICATIONS  (PRN):  acetaminophen   Tablet .. 650 milliGRAM(s) Oral every 6 hours PRN Temp greater or equal to 38C (100.4F), Mild Pain (1 - 3)  melatonin 3 milliGRAM(s) Oral at bedtime PRN Insomnia      Allergies    heparin (Other)    Intolerances        REVIEW OF SYSTEMS:  Comprehensive ROS as per HPI    Vital Signs Last 24 Hrs  T(C): 37.2 (12 Sep 2021 04:14), Max: 37.2 (12 Sep 2021 04:14)  T(F): 98.9 (12 Sep 2021 04:14), Max: 98.9 (12 Sep 2021 04:14)  HR: 68 (12 Sep 2021 04:14) (66 - 71)  BP: 122/59 (12 Sep 2021 04:14) (122/59 - 153/67)  BP(mean): --  RR: 17 (12 Sep 2021 04:14) (17 - 18)  SpO2: 97% (12 Sep 2021 04:14) (95% - 97%)    PHYSICAL EXAM:  GENERAL: NAD  HEENT:  anicteric, moist mucous membranes  CHEST/LUNG:  CTA b/l, no rales, wheezes, or rhonchi  HEART:  RRR, S1, S2  ABDOMEN:  BS+, soft, nontender, nondistended  EXTREMITIES: Ecchymoses throughout b/l upper and lower extremities. Left lower extremity has scaly/crusted appearance with erythematous dorsal foot and webspaces with broken skin. Purulent fluid in webspaces, tender to palpation. Bilateral lower extremities warm with bilateral DP and PT pulses difficult to appreciate.  Ischemic changes on both lower extremities. Stage 1 ulcer on left heel. No calf tenderness.  NERVOUS SYSTEM: Answers questions and follows commands appropriately    LABS:                        9.1    12.90 )-----------( 335      ( 12 Sep 2021 08:10 )             27.8     CBC Full  -  ( 12 Sep 2021 08:10 )  WBC Count : 12.90 K/uL  Hemoglobin : 9.1 g/dL  Hematocrit : 27.8 %  Platelet Count - Automated : 335 K/uL  Mean Cell Volume : 87.4 fl  Mean Cell Hemoglobin : 28.6 pg  Mean Cell Hemoglobin Concentration : 32.7 gm/dL  Auto Neutrophil # : 10.55 K/uL  Auto Lymphocyte # : 0.90 K/uL  Auto Monocyte # : 0.94 K/uL  Auto Eosinophil # : 0.34 K/uL  Auto Basophil # : 0.04 K/uL  Auto Neutrophil % : 81.8 %  Auto Lymphocyte % : 7.0 %  Auto Monocyte % : 7.3 %  Auto Eosinophil % : 2.6 %  Auto Basophil % : 0.3 %    12 Sep 2021 08:10    140    |  111    |  37     ----------------------------<  85     4.0     |  22     |  1.00     Ca    8.4        12 Sep 2021 08:10    TPro  6.0    /  Alb  1.8    /  TBili  0.3    /  DBili  x      /  AST  15     /  ALT  16     /  AlkPhos  55     12 Sep 2021 08:10        CAPILLARY BLOOD GLUCOSE            Culture - Abscess with Gram Stain (collected 09-07-21 @ 15:29)  Source: .Abscess left foot  Final Report (09-12-21 @ 09:03):    Numerous Enterobacter cloacae complex    Numerous Pseudomonas aeruginosa    Numerous Proteus mirabilis    Normal skin joseline isolated  Organism: Enterobacter cloacae complex  Pseudomonas aeruginosa  Proteus mirabilis (09-12-21 @ 09:03)  Organism: Proteus mirabilis (09-12-21 @ 09:03)      -  Amikacin: S <=16      -  Amoxicillin/Clavulanic Acid: S <=8/4      -  Ampicillin: R >16 These ampicillin results predict results for amoxicillin      -  Ampicillin/Sulbactam: R >16/8 Enterobacter, Citrobacter, and Serratia may develop resistance during prolonged therapy (3-4 days)      -  Aztreonam: S <=4      -  Cefazolin: R >16 Enterobacter, Citrobacter, and Serratia may develop resistance during prolonged therapy (3-4 days)      -  Cefepime: S <=2      -  Cefoxitin: S <=8      -  Ceftriaxone: S <=1 Enterobacter, Citrobacter, and Serratia may develop resistance during prolonged therapy      -  Ciprofloxacin: R 2      -  Ertapenem: S <=0.5      -  Gentamicin: S 4      -  Levofloxacin: R 4      -  Meropenem: S <=1      -  Piperacillin/Tazobactam: S <=8      -  Tobramycin: S <=2      -  Trimethoprim/Sulfamethoxazole: R >2/38      Method Type: JOVANNA  Organism: Pseudomonas aeruginosa (09-12-21 @ 09:03)      -  Amikacin: S <=16      -  Aztreonam: S <=4      -  Cefepime: S <=2      -  Ceftazidime: S <=1      -  Ciprofloxacin: S <=0.25      -  Gentamicin: S <=2      -  Imipenem: S <=1      -  Levofloxacin: S <=0.5      -  Meropenem: S <=1      -  Piperacillin/Tazobactam: S <=8      -  Tobramycin: S <=2      Method Type: JOVANNA  Organism: Enterobacter cloacae complex (09-12-21 @ 09:03)      -  Amikacin: S <=16      -  Amoxicillin/Clavulanic Acid: R 16/8      -  Ampicillin: R <=8 These ampicillin results predict results for amoxicillin      -  Ampicillin/Sulbactam: R <=4/2 Enterobacter, Citrobacter, and Serratia may develop resistance during prolonged therapy (3-4 days)      -  Aztreonam: S <=4      -  Cefazolin: R >16 Enterobacter, Citrobacter, and Serratia may develop resistance during prolonged therapy (3-4 days)      -  Cefepime: S <=2      -  Cefoxitin: R <=8      -  Ceftriaxone: S <=1 Enterobacter, Citrobacter, and Serratia may develop resistance during prolonged therapy      -  Ciprofloxacin: S <=0.25      -  Ertapenem: S <=0.5      -  Gentamicin: S <=2      -  Imipenem: S <=1      -  Levofloxacin: S <=0.5      -  Meropenem: S <=1      -  Piperacillin/Tazobactam: S <=8      -  Tobramycin: S <=2      -  Trimethoprim/Sulfamethoxazole: S <=0.5/9.5      Method Type: JOVANNA    Culture - Other (collected 09-07-21 @ 03:16)  Source: .Other left foot wound  Final Report (09-11-21 @ 13:24):    Numerous Pseudomonas aeruginosa    Moderate Proteus mirabilis  Organism: Pseudomonas aeruginosa  Proteus mirabilis (09-11-21 @ 13:24)  Organism: Proteus mirabilis (09-11-21 @ 13:24)      -  Amikacin: S <=16      -  Amoxicillin/Clavulanic Acid: S <=8/4      -  Ampicillin: R >16 These ampicillin results predict results for amoxicillin      -  Ampicillin/Sulbactam: R >16/8 Enterobacter, Citrobacter, and Serratia may develop resistance during prolonged therapy (3-4 days)      -  Aztreonam: S <=4      -  Cefazolin: R >16 Enterobacter, Citrobacter, and Serratia may develop resistance during prolonged therapy (3-4 days)      -  Cefepime: S <=2      -  Cefoxitin: S <=8      -  Ceftriaxone: S <=1 Enterobacter, Citrobacter, and Serratia may develop resistance during prolonged therapy      -  Ciprofloxacin: R 2      -  Ertapenem: S <=0.5      -  Gentamicin: S <=2      -  Levofloxacin: R 4      -  Meropenem: S <=1      -  Piperacillin/Tazobactam: S <=8      -  Tobramycin: S <=2      -  Trimethoprim/Sulfamethoxazole: R >2/38      Method Type: JOVANNA  Organism: Pseudomonas aeruginosa (09-11-21 @ 13:24)      -  Amikacin: S <=16      -  Aztreonam: S <=4      -  Cefepime: S <=2      -  Ceftazidime: S <=1      -  Ciprofloxacin: S <=0.25      -  Gentamicin: S <=2      -  Imipenem: S <=1      -  Levofloxacin: S <=0.5      -  Meropenem: S <=1      -  Piperacillin/Tazobactam: S <=8      -  Tobramycin: S <=2      Method Type: JOVANNA    Culture - Blood (collected 09-07-21 @ 01:45)  Source: .Blood Blood  Final Report (09-12-21 @ 06:10):    No Growth Final    Culture - Blood (collected 09-07-21 @ 01:43)  Source: .Blood Blood  Final Report (09-12-21 @ 06:10):    No Growth Final        RADIOLOGY & ADDITIONAL TESTS:    Personally reviewed.     Consultant(s) Notes Reviewed:  [x] YES  [ ] NO

## 2021-09-12 NOTE — PROGRESS NOTE ADULT - ASSESSMENT
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

## 2021-09-13 LAB
ALBUMIN SERPL ELPH-MCNC: 1.8 G/DL — LOW (ref 3.3–5)
ALP SERPL-CCNC: 52 U/L — SIGNIFICANT CHANGE UP (ref 40–120)
ALT FLD-CCNC: 17 U/L — SIGNIFICANT CHANGE UP (ref 12–78)
ANION GAP SERPL CALC-SCNC: 7 MMOL/L — SIGNIFICANT CHANGE UP (ref 5–17)
AST SERPL-CCNC: 16 U/L — SIGNIFICANT CHANGE UP (ref 15–37)
BASOPHILS # BLD AUTO: 0.06 K/UL — SIGNIFICANT CHANGE UP (ref 0–0.2)
BASOPHILS NFR BLD AUTO: 0.4 % — SIGNIFICANT CHANGE UP (ref 0–2)
BILIRUB SERPL-MCNC: 0.5 MG/DL — SIGNIFICANT CHANGE UP (ref 0.2–1.2)
BUN SERPL-MCNC: 46 MG/DL — HIGH (ref 7–23)
CALCIUM SERPL-MCNC: 8.2 MG/DL — LOW (ref 8.5–10.1)
CHLORIDE SERPL-SCNC: 110 MMOL/L — HIGH (ref 96–108)
CO2 SERPL-SCNC: 23 MMOL/L — SIGNIFICANT CHANGE UP (ref 22–31)
CREAT SERPL-MCNC: 1.3 MG/DL — SIGNIFICANT CHANGE UP (ref 0.5–1.3)
EOSINOPHIL # BLD AUTO: 0.28 K/UL — SIGNIFICANT CHANGE UP (ref 0–0.5)
EOSINOPHIL NFR BLD AUTO: 2.1 % — SIGNIFICANT CHANGE UP (ref 0–6)
GLUCOSE SERPL-MCNC: 91 MG/DL — SIGNIFICANT CHANGE UP (ref 70–99)
HCT VFR BLD CALC: 28.7 % — LOW (ref 34.5–45)
HGB BLD-MCNC: 9.6 G/DL — LOW (ref 11.5–15.5)
IMM GRANULOCYTES NFR BLD AUTO: 1.2 % — SIGNIFICANT CHANGE UP (ref 0–1.5)
LYMPHOCYTES # BLD AUTO: 0.8 K/UL — LOW (ref 1–3.3)
LYMPHOCYTES # BLD AUTO: 5.9 % — LOW (ref 13–44)
MCHC RBC-ENTMCNC: 29.1 PG — SIGNIFICANT CHANGE UP (ref 27–34)
MCHC RBC-ENTMCNC: 33.4 GM/DL — SIGNIFICANT CHANGE UP (ref 32–36)
MCV RBC AUTO: 87 FL — SIGNIFICANT CHANGE UP (ref 80–100)
MONOCYTES # BLD AUTO: 0.98 K/UL — HIGH (ref 0–0.9)
MONOCYTES NFR BLD AUTO: 7.3 % — SIGNIFICANT CHANGE UP (ref 2–14)
NEUTROPHILS # BLD AUTO: 11.21 K/UL — HIGH (ref 1.8–7.4)
NEUTROPHILS NFR BLD AUTO: 83.1 % — HIGH (ref 43–77)
NRBC # BLD: 0 /100 WBCS — SIGNIFICANT CHANGE UP (ref 0–0)
PLATELET # BLD AUTO: 330 K/UL — SIGNIFICANT CHANGE UP (ref 150–400)
POTASSIUM SERPL-MCNC: 3.8 MMOL/L — SIGNIFICANT CHANGE UP (ref 3.5–5.3)
POTASSIUM SERPL-SCNC: 3.8 MMOL/L — SIGNIFICANT CHANGE UP (ref 3.5–5.3)
PROT SERPL-MCNC: 6.1 G/DL — SIGNIFICANT CHANGE UP (ref 6–8.3)
RBC # BLD: 3.3 M/UL — LOW (ref 3.8–5.2)
RBC # FLD: 15.3 % — HIGH (ref 10.3–14.5)
SARS-COV-2 RNA SPEC QL NAA+PROBE: SIGNIFICANT CHANGE UP
SODIUM SERPL-SCNC: 140 MMOL/L — SIGNIFICANT CHANGE UP (ref 135–145)
WBC # BLD: 13.49 K/UL — HIGH (ref 3.8–10.5)
WBC # FLD AUTO: 13.49 K/UL — HIGH (ref 3.8–10.5)

## 2021-09-13 PROCEDURE — 99233 SBSQ HOSP IP/OBS HIGH 50: CPT

## 2021-09-13 PROCEDURE — 99232 SBSQ HOSP IP/OBS MODERATE 35: CPT

## 2021-09-13 RX ADMIN — Medication 650 MILLIGRAM(S): at 21:16

## 2021-09-13 RX ADMIN — Medication 25 MICROGRAM(S): at 05:18

## 2021-09-13 RX ADMIN — APIXABAN 2.5 MILLIGRAM(S): 2.5 TABLET, FILM COATED ORAL at 17:37

## 2021-09-13 RX ADMIN — Medication 81 MILLIGRAM(S): at 12:10

## 2021-09-13 RX ADMIN — SIMVASTATIN 40 MILLIGRAM(S): 20 TABLET, FILM COATED ORAL at 21:17

## 2021-09-13 RX ADMIN — Medication 10 MILLIGRAM(S): at 21:17

## 2021-09-13 RX ADMIN — CLOPIDOGREL BISULFATE 75 MILLIGRAM(S): 75 TABLET, FILM COATED ORAL at 12:10

## 2021-09-13 RX ADMIN — ATENOLOL 100 MILLIGRAM(S): 25 TABLET ORAL at 05:18

## 2021-09-13 RX ADMIN — Medication 20 MILLIGRAM(S): at 05:18

## 2021-09-13 RX ADMIN — Medication 10 MILLIGRAM(S): at 07:55

## 2021-09-13 RX ADMIN — APIXABAN 2.5 MILLIGRAM(S): 2.5 TABLET, FILM COATED ORAL at 05:18

## 2021-09-13 RX ADMIN — CEFEPIME 100 MILLIGRAM(S): 1 INJECTION, POWDER, FOR SOLUTION INTRAMUSCULAR; INTRAVENOUS at 17:37

## 2021-09-13 RX ADMIN — Medication 10 MILLIGRAM(S): at 12:11

## 2021-09-13 RX ADMIN — CEFEPIME 100 MILLIGRAM(S): 1 INJECTION, POWDER, FOR SOLUTION INTRAMUSCULAR; INTRAVENOUS at 05:18

## 2021-09-13 RX ADMIN — LOSARTAN POTASSIUM 100 MILLIGRAM(S): 100 TABLET, FILM COATED ORAL at 07:55

## 2021-09-13 NOTE — PROGRESS NOTE ADULT - PROBLEM SELECTOR PLAN 1
Hx of foot wounds, past R heel wound required skin graft. New left heel ulcer, is padded to relieve pressure.  - Blood cx - NGTD.  - Wound cx - Numerous pseudomonas aeruginosa, enterobacter cloacae, and proteus mirabilis.  - MRSA PCR is negative.  - Continue Cefepime 2g q12, s/p Zosyn. ID f/u for antibiotic recommendations for dc planning   - Tylenol 650mg PRN for mild pain.  - Cont. wound care with Aquacel Ag and dry sterile dressing every 2 days, per Podiatry guidance.  - Pt is to [facility] with Dr. Irizarry or Dr. Zheng within 1 week upon d/c.  - XRay L Foot negative for osteomyelitis or any destructive osseous changes.  -no plan on MRI per podiatry   - ID Dr. Gonzalez following.  - Podiatry (d/w with Dr. Zheng) following.  - Vascular Dr. Grover following.  - Plan for Dc monday

## 2021-09-13 NOTE — PROGRESS NOTE ADULT - ASSESSMENT
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

## 2021-09-13 NOTE — PROGRESS NOTE ADULT - SUBJECTIVE AND OBJECTIVE BOX
[facility]  INFECTIOUS DISEASES   [address] [place] [address]  Tel: [phone]     Fax: [phone]  =======================================================    [id number]  NITA ZURITA     Follow up: Left foot infection     No new changes or overnight event. No fever. Mild leukocytosis.  Culture with proteus, pseudomonas, and enterobacter.   Pseudomonas and enterobacter are S to fluoroquinolones and proteus S to 3rd gen ceph.     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 3.375 Gram(s) IV Intermittent every 8 hours     REVIEW OF SYSTEMS:  CONSTITUTIONAL:  No Fever or chills  HEENT:  No diplopia or blurred vision.  No sore throat or runny nose.  CARDIOVASCULAR:  No chest pain or SOB.  RESPIRATORY:  No cough, shortness of breath, PND or orthopnea.  GASTROINTESTINAL:  No nausea, vomiting or diarrhea.  GENITOURINARY:  No dysuria, frequency or urgency. No Blood in urine  MUSCULOSKELETAL:  no joint aches, no muscle pain  SKIN:  foot ulcer and pain   PSYCHIATRIC:  No disorder of thought or mood.  ENDOCRINE:  No heat or cold intolerance, polyuria or polydipsia.  HEMATOLOGICAL:  No easy bruising or bleeding.     Physical Exam:  Vital Signs Last 24 Hrs  T(C): 36.6 (13 Sep 2021 12:04), Max: 37.6 (12 Sep 2021 20:08)  T(F): 97.9 (13 Sep 2021 12:04), Max: 99.6 (12 Sep 2021 20:08)  HR: 68 (13 Sep 2021 12:04) (68 - 79)  BP: 138/60 (13 Sep 2021 12:04) (118/54 - 143/69)  BP(mean): --  RR: 18 (13 Sep 2021 12:04) (18 - 18)  SpO2: 93% (13 Sep 2021 12:04) (93% - 94%)  GEN: NAD  HEENT: normocephalic and atraumatic. EOMI. PERRL.    NECK: Supple.  No lymphadenopathy   LUNGS: Clear to auscultation.  HEART: Regular rate and rhythm   ABDOMEN: Soft, nontender, and nondistended.  Positive bowel sounds.    : No CVA tenderness  EXTREMITIES: Lower extremities with multiple crusted ulcers in lower legs, chronic skin changes, left foot with hammer toes in 2nd and 3rd toes with ischemic changes, between toes from 1 to 3rd there are some pus discharge that was cleaned   NEUROLOGIC: grossly intact.  PSYCHIATRIC: Appropriate affect .  SKIN: No rash      Labs:                        9.6    13.49 )-----------( 330      ( 13 Sep 2021 08:04 )             28.7     09-13    140  |  110<H>  |  46<H>  ----------------------------<  91  3.8   |  23  |  1.30    Ca    8.2<L>      13 Sep 2021 08:04    TPro  6.1  /  Alb  1.8<L>  /  TBili  0.5  /  DBili  x   /  AST  16  /  ALT  17  /  AlkPhos  52  09-13    Culture - Abscess with Gram Stain (collected 09-07-21 @ 15:29)  Source: .Abscess left foot  Final Report (09-12-21 @ 09:03):    Numerous Enterobacter cloacae complex    Numerous Pseudomonas aeruginosa    Numerous Proteus mirabilis    Normal skin joseline isolated  Organism: Enterobacter cloacae complex  Pseudomonas aeruginosa  Proteus mirabilis (09-12-21 @ 09:03)  Organism: Proteus mirabilis (09-12-21 @ 09:03)    Sensitivities:      -  Amikacin: S <=16      -  Amoxicillin/Clavulanic Acid: S <=8/4      -  Ampicillin: R >16 These ampicillin results predict results for amoxicillin      -  Ampicillin/Sulbactam: R >16/8 Enterobacter, Citrobacter, and Serratia may develop resistance during prolonged therapy (3-4 days)      -  Aztreonam: S <=4      -  Cefazolin: R >16 Enterobacter, Citrobacter, and Serratia may develop resistance during prolonged therapy (3-4 days)      -  Cefepime: S <=2      -  Cefoxitin: S <=8      -  Ceftriaxone: S <=1 Enterobacter, Citrobacter, and Serratia may develop resistance during prolonged therapy      -  Ciprofloxacin: R 2      -  Ertapenem: S <=0.5      -  Gentamicin: S 4      -  Levofloxacin: R 4      -  Meropenem: S <=1      -  Piperacillin/Tazobactam: S <=8      -  Tobramycin: S <=2      -  Trimethoprim/Sulfamethoxazole: R >2/38      Method Type: JOVANNA  Organism: Pseudomonas aeruginosa (09-12-21 @ 09:03)    Sensitivities:      -  Amikacin: S <=16      -  Aztreonam: S <=4      -  Cefepime: S <=2      -  Ceftazidime: S <=1      -  Ciprofloxacin: S <=0.25      -  Gentamicin: S <=2      -  Imipenem: S <=1      -  Levofloxacin: S <=0.5      -  Meropenem: S <=1      -  Piperacillin/Tazobactam: S <=8      -  Tobramycin: S <=2      Method Type: JOVANNA  Organism: Enterobacter cloacae complex (09-12-21 @ 09:03)    Sensitivities:      -  Amikacin: S <=16      -  Amoxicillin/Clavulanic Acid: R 16/8      -  Ampicillin: R <=8 These ampicillin results predict results for amoxicillin      -  Ampicillin/Sulbactam: R <=4/2 Enterobacter, Citrobacter, and Serratia may develop resistance during prolonged therapy (3-4 days)      -  Aztreonam: S <=4      -  Cefazolin: R >16 Enterobacter, Citrobacter, and Serratia may develop resistance during prolonged therapy (3-4 days)      -  Cefepime: S <=2      -  Cefoxitin: R <=8      -  Ceftriaxone: S <=1 Enterobacter, Citrobacter, and Serratia may develop resistance during prolonged therapy      -  Ciprofloxacin: S <=0.25      -  Ertapenem: S <=0.5      -  Gentamicin: S <=2      -  Imipenem: S <=1      -  Levofloxacin: S <=0.5      -  Meropenem: S <=1      -  Piperacillin/Tazobactam: S <=8      -  Tobramycin: S <=2      -  Trimethoprim/Sulfamethoxazole: S <=0.5/9.5      Method Type: JOVANNA    Culture - Other (collected 09-07-21 @ 03:16)  Source: .Other left foot wound  Final Report (09-11-21 @ 13:24):    Numerous Pseudomonas aeruginosa    Moderate Proteus mirabilis  Organism: Pseudomonas aeruginosa  Proteus mirabilis (09-11-21 @ 13:24)  Organism: Proteus mirabilis (09-11-21 @ 13:24)    Sensitivities:      -  Amikacin: S <=16      -  Amoxicillin/Clavulanic Acid: S <=8/4      -  Ampicillin: R >16 These ampicillin results predict results for amoxicillin      -  Ampicillin/Sulbactam: R >16/8 Enterobacter, Citrobacter, and Serratia may develop resistance during prolonged therapy (3-4 days)      -  Aztreonam: S <=4      -  Cefazolin: R >16 Enterobacter, Citrobacter, and Serratia may develop resistance during prolonged therapy (3-4 days)      -  Cefepime: S <=2      -  Cefoxitin: S <=8      -  Ceftriaxone: S <=1 Enterobacter, Citrobacter, and Serratia may develop resistance during prolonged therapy      -  Ciprofloxacin: R 2      -  Ertapenem: S <=0.5      -  Gentamicin: S <=2      -  Levofloxacin: R 4      -  Meropenem: S <=1      -  Piperacillin/Tazobactam: S <=8      -  Tobramycin: S <=2      -  Trimethoprim/Sulfamethoxazole: R >2/38      Method Type: JOVANNA  Organism: Pseudomonas aeruginosa (09-11-21 @ 13:24)    Sensitivities:      -  Amikacin: S <=16      -  Aztreonam: S <=4      -  Cefepime: S <=2      -  Ceftazidime: S <=1      -  Ciprofloxacin: S <=0.25      -  Gentamicin: S <=2      -  Imipenem: S <=1      -  Levofloxacin: S <=0.5      -  Meropenem: S <=1      -  Piperacillin/Tazobactam: S <=8      -  Tobramycin: S <=2      Method Type: JOVANNA    Culture - Blood (collected 09-07-21 @ 01:45)  Source: .Blood Blood  Final Report (09-12-21 @ 06:10):    No Growth Final    Culture - Blood (collected 09-07-21 @ 01:43)  Source: .Blood Blood  Final Report (09-12-21 @ 06:10):    No Growth Final    WBC Count: 13.49 K/uL (09-13-21 @ 08:04)  WBC Count: 12.90 K/uL (09-12-21 @ 08:10)  WBC Count: 13.46 K/uL (09-11-21 @ 09:16)  WBC Count: 12.22 K/uL (09-10-21 @ 08:30)  WBC Count: 13.10 K/uL (09-09-21 @ 09:58)    Creatinine, Serum: 1.30 mg/dL (09-13-21 @ 08:04)  Creatinine, Serum: 1.00 mg/dL (09-12-21 @ 08:10)  Creatinine, Serum: 1.10 mg/dL (09-11-21 @ 09:16)  Creatinine, Serum: 1.20 mg/dL (09-10-21 @ 08:30)  Creatinine, Serum: 1.20 mg/dL (09-09-21 @ 09:58)    C-Reactive Protein, Serum: 21 mg/L (09-07-21 @ 00:16)    Ferritin, Serum: 82 ng/mL (09-07-21 @ 12:17)    Sedimentation Rate, Erythrocyte: 53 mm/hr (09-06-21 @ 15:10)    COVID-19 PCR: NotDetec (09-13-21 @ 05:44)  COVID-19 PCR: NotDetec (09-06-21 @ 15:15)    All imaging and other data have been reviewed.  < from: Xray Foot AP + Lateral + Oblique, Left (09.06.21 @ 14:48) >  IMPRESSION: No acute or destructive osseous pathology. No plain film evidence of osteomyelitis.      Assessment and Plan:   89y Female, from [facility], with PMH of HTN, CAD, PAD, hypothyroidism, chronic wounds, was admitted with left foot wound.   Chronic skin changes with areas of open wounds and some pus discharge between toes, possibly main problem is the ischemia, for superficial ulcer can treat for possible cellulitis,   I wouldn't be very aggressive at this time.     1- Left foot wound and infection with ischemia    Recommendations:   - Blood culture NGTD  - Wound culture with pseudomonas, enterobacter and proteus S to cefepime  - Xray neg for OM, but she has ischemic toes   - Vascular and podiatry not planning for any surgical intervention.    - Continue cefepime while in the hospital   - Can switch to oral Cipro and vantin to complete 2 weeks total form start of antibiotics  - Needs wound care follow up.     Will follow PRN.     Janet Gonzalez MD  Division of Infectious Diseases   Cell [phone] between 8am and 6pm   After 6pm and weekends please call ID service at [phone].

## 2021-09-13 NOTE — PROGRESS NOTE ADULT - SUBJECTIVE AND OBJECTIVE BOX
89y year old Female seen at [facility] 1EAS 104 D1 for ischemic changes to the left lower extremity and early gangrenous changes to the toes 1-5, particularly 2-3 on the left. The patient can not recall how long she has the wounds on her wounds on her left foot but states that her daughter knows. The patient states that she has been following up at the wound care center at [facility] with Dr. Smith. The patient is unable to recall what they have been doing to her wounds there. The patient also mentions that she had  an appointment today with Dr. Smith. The patient states that her left foot and leg is extremely sensitive to the touch along the LLE. The patient is currently residing at a nursing home. Denies any fever, chills, nausea, vomiting, chest pain, shortness of breath, or calf pain at this time.  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 3.375 Gram(s) IV Intermittent every 8 hours senna 2 Tablet(s) Oral at bedtime simvastatin 40 milliGRAM(s) Oral at bedtime  MEDICATIONS  (PRN): acetaminophen   Tablet .. 650 milliGRAM(s) Oral every 6 hours PRN Temp greater or equal to 38C (100.4F), Mild Pain (1 - 3) melatonin 3 milliGRAM(s) Oral at bedtime PRN Insomnia   Social History: Former smoker, "didn't inhale", from ages [age]  Denies EtOH Denies recreational drugs  Non-ambulatory, uses wheelchair Requires assistance with ADLs from [facility] (06 Sep 2021 19:12)   FAMILY HISTORY: Family history of breast cancer in mother (Mother)    Vital Signs Last 24 Hrs T(C): 36.7 (11 Sep 2021 14:01), Max: 36.8 (10 Sep 2021 19:59) T(F): 98.1 (11 Sep 2021 14:01), Max: 98.2 (10 Sep 2021 19:59) HR: 66 (11 Sep 2021 14:01) (63 - 73) BP: 153/67 (11 Sep 2021 14:01) (122/68 - 153/67) BP(mean): -- RR: 17 (11 Sep 2021 14:01) (17 - 17) SpO2: 95% (11 Sep 2021 14:01) (94% - 95%)  PHYSICAL EXAM: Vascular: DP & PT nonpalpable bilaterally, Capillary refill less than 4 or 5 seconds, No Digital hair present bilaterally, Diffuse erythema along the left lower extremity , skin temperature colder than within normal limit  Neurological: Gross epicritic sensation b/l  Musculoskeletal: 5/5 strength in all quadrants bilaterally, AJ & STJ ROM intact, pain and tenderness as well as sensitivity along the left lower extremity  Dermatological:  1.Gangrene of the left 2nd-4th digits  2. Ischemic changes along the left lower extremity  3. Dry scaly skin along the dorsal aspect of the left foot  4. Thicken, dystropic, shorten dystrophic nails 1-5 b/l    CBC Full  -  ( 11 Sep 2021 09:16 ) WBC Count : 13.46 K/uL RBC Count : 3.72 M/uL Hemoglobin : 10.6 g/dL Hematocrit : 33.1 % Platelet Count - Automated : 319 K/uL Mean Cell Volume : 89.0 fl Mean Cell Hemoglobin : 28.5 pg Mean Cell Hemoglobin Concentration : 32.0 gm/dL Auto Neutrophil # : 11.55 K/uL Auto Lymphocyte # : 0.75 K/uL Auto Monocyte # : 0.70 K/uL Auto Eosinophil # : 0.28 K/uL Auto Basophil # : 0.06 K/uL Auto Neutrophil % : 85.8 % Auto Lymphocyte % : 5.6 % Auto Monocyte % : 5.2 % Auto Eosinophil % : 2.1 % Auto Basophil % : 0.4 %   ----------CHEM PANEL----------  09-11  140  |  110<H>  |  35<H> ----------------------------<  107<H> 4.3   |  26  |  1.10  Ca    9.0      11 Sep 2021 09:16 Mg     2.2     09-11        Imaging:  EXAM: XR FOOT COMP MIN 3 VIEWS LT   PROCEDURE DATE: 09/06/2021    INTERPRETATION: Ulcer left foot.  3 views left foot.  Advanced diffuse senile demineralization. Multiple hammertoe deformities. No fracture dislocation focal bone lysis or unusual periosteal reaction. Faint small vessel calcification. No soft tissue gas. Mild diffuse soft tissue edema  IMPRESSION: No acute or destructive osseous pathology. No plain film evidence of osteomyelitis.  --- End of Report ---      CONNOR CEDILLO MD; Attending Radiologist This document has been electronically signed. Sep 6 2021 2:54PM Notes

## 2021-09-13 NOTE — PROGRESS NOTE ADULT - SUBJECTIVE AND OBJECTIVE BOX
Patient is a 89y old  Female who presents with a chief complaint of L foot wound (12 Sep 2021 10:16)      INTERVAL HPI/OVERNIGHT EVENTS: Patient seen and examined at bedside. Denies new symptoms, complaints. No overnight events noted     MEDICATIONS  (STANDING):  apixaban 2.5 milliGRAM(s) Oral two times a day  aspirin enteric coated 81 milliGRAM(s) Oral daily  ATENolol  Tablet 100 milliGRAM(s) Oral daily  cefepime   IVPB 2000 milliGRAM(s) IV Intermittent every 12 hours  clopidogrel Tablet 75 milliGRAM(s) Oral daily  furosemide    Tablet 20 milliGRAM(s) Oral daily  hydrALAZINE 10 milliGRAM(s) Oral every 8 hours  influenza   Vaccine 0.5 milliLiter(s) IntraMuscular once  levothyroxine 25 MICROGram(s) Oral daily  losartan 100 milliGRAM(s) Oral daily  senna 2 Tablet(s) Oral at bedtime  simvastatin 40 milliGRAM(s) Oral at bedtime    MEDICATIONS  (PRN):  acetaminophen   Tablet .. 650 milliGRAM(s) Oral every 6 hours PRN Temp greater or equal to 38C (100.4F), Mild Pain (1 - 3)  melatonin 3 milliGRAM(s) Oral at bedtime PRN Insomnia      Allergies    heparin (Other)    Intolerances        REVIEW OF SYSTEMS:  CONSTITUTIONAL: No fever, or fatigue  EYES: No eye pain, visual disturbances, or discharge  ENMT:  No difficulty hearing, tinnitus, vertigo; No sinus or throat pain  NECK: No pain or stiffness  RESPIRATORY: No cough, wheezing, chills or hemoptysis; No shortness of breath  CARDIOVASCULAR: No chest pain, palpitations, dizziness, or leg swelling  GASTROINTESTINAL: No abdominal or epigastric pain. No nausea, vomiting, or hematemesis; No diarrhea or constipation.   GENITOURINARY: No dysuria, frequency, hematuria, or incontinence  NEUROLOGICAL: No headaches,loss of strength, numbness, or tremors  SKIN: No itching, burning, rashes, or lesions   LYMPH NODES: No enlarged glands  MUSCULOSKELETAL: No joint pain or swelling; No muscle, back, or extremity pain  HEME/LYMPH: No easy bruising, or bleeding gums  ALLERGY AND IMMUNOLOGIC: No hives or eczema    Vital Signs Last 24 Hrs  T(C): 37 (13 Sep 2021 05:45), Max: 37.6 (12 Sep 2021 20:08)  T(F): 98.6 (13 Sep 2021 05:45), Max: 99.6 (12 Sep 2021 20:08)  HR: 70 (13 Sep 2021 05:45) (68 - 79)  BP: 118/54 (13 Sep 2021 05:45) (115/56 - 143/69)  BP(mean): --  RR: 18 (13 Sep 2021 05:45) (18 - 18)  SpO2: 93% (13 Sep 2021 05:45) (93% - 97%)    PHYSICAL EXAM:  GENERAL: NAD, Awake, Alert.   HEENT:  anicteric, moist mucous membranes  CHEST/LUNG:  CTA b/l, no rales, wheezes, or rhonchi  HEART:  RRR, S1, S2  ABDOMEN:  BS+, soft, nontender, nondistended  EXTREMITIES: Ecchymoses throughout b/l upper and lower extremities. Left lower extremity has scaly/crusted appearance with erythematous dorsal foot and webspaces with broken skin. Purulent fluid in webspaces, tender to palpation. Bilateral lower extremities warm with bilateral DP and PT pulses difficult to appreciate.  Ischemic changes on both lower extremities. Stage 1 ulcer on left heel. No calf tenderness.  NERVOUS SYSTEM: Answers questions and follows commands appropriately    LABS:                        9.6    13.49 )-----------( 330      ( 13 Sep 2021 08:04 )             28.7     13 Sep 2021 08:04    140    |  110    |  46     ----------------------------<  91     3.8     |  23     |  1.30     Ca    8.2        13 Sep 2021 08:04    TPro  6.1    /  Alb  1.8    /  TBili  0.5    /  DBili  x      /  AST  16     /  ALT  17     /  AlkPhos  52     13 Sep 2021 08:04      CAPILLARY BLOOD GLUCOSE        BLOOD CULTURE    RADIOLOGY & ADDITIONAL TESTS:    Imaging Personally Reviewed:  [ ] YES     Consultant(s) Notes Reviewed:      Care Discussed with Consultants/Other Providers:

## 2021-09-13 NOTE — PROGRESS NOTE ADULT - PROBLEM SELECTOR PLAN 2
- Continue asa, plavix and statin.  - VA duplex LLE showed diffuse calcified atherosclerosis, stenoses in common femoral and distal popliteal a. with decreased flow below knee, peroneal a. occluded, and posterior tibial a. nearly occluded.  - VA physio of b/l lower extremities showed right LAI of 1.1 with decreased pulse wave in digits and left LAI of 1.09 with normal pulse wave.  - Vascular following, recs appreciated. Suggested medical management. Also patient and daughter not interested in any surgical interventions.

## 2021-09-13 NOTE — PROGRESS NOTE ADULT - PROBLEM SELECTOR PROBLEM 3
NIHARIKA (acute kidney injury)

## 2021-09-13 NOTE — CHART NOTE - NSCHARTNOTEFT_GEN_A_CORE
Assessment:   89yFemalePatient is a 89y old  Female who presents with a chief complaint of L foot wound (13 Sep 2021 08:36)  Per pt, d/c today to residential.  Diet upgraded to Dys 3 per SLP recommendation.      Factors impacting intake: [x ] none [ ] nausea  [ ] vomiting [ ] diarrhea [ ] constipation  [ ]chewing problems [ ] swallowing issues  [ ] other:     Diet Presciption: Diet, Dysphagia 3 Soft-Thin Liquids (09-07-21 @ 16:14)    Intake: fair to good; preferences obtained; menu adjusted  Pump study if applicable:    Current Weight: variable, between 114-122#(admit 121#)    Pertinent Medications: MEDICATIONS  (STANDING):  apixaban 2.5 milliGRAM(s) Oral two times a day  aspirin enteric coated 81 milliGRAM(s) Oral daily  ATENolol  Tablet 100 milliGRAM(s) Oral daily  cefepime   IVPB 2000 milliGRAM(s) IV Intermittent every 12 hours  clopidogrel Tablet 75 milliGRAM(s) Oral daily  furosemide    Tablet 20 milliGRAM(s) Oral daily  hydrALAZINE 10 milliGRAM(s) Oral every 8 hours  influenza   Vaccine 0.5 milliLiter(s) IntraMuscular once  levothyroxine 25 MICROGram(s) Oral daily  losartan 100 milliGRAM(s) Oral daily  senna 2 Tablet(s) Oral at bedtime  simvastatin 40 milliGRAM(s) Oral at bedtime    MEDICATIONS  (PRN):  acetaminophen   Tablet .. 650 milliGRAM(s) Oral every 6 hours PRN Temp greater or equal to 38C (100.4F), Mild Pain (1 - 3)  melatonin 3 milliGRAM(s) Oral at bedtime PRN Insomnia    Pertinent Labs:   Skin: pressure injuries     [  ]yes      [x  ]no    Estimated Needs:   [x ] no change since previous assessment  [ ] recalculated:     Previous Nutrition Diagnosis:  [ x ] none   [  ] swallowing difficulty  [  ] Inadequate Energy Intake [  ]Inadequate Oral Intake [  ] Excessive Energy Intake   [  ] Underweight [  ] Increased Nutrient Needs    [  ]decreased nutrient needs [  ] Overweight/Obesity   [  ] Altered GI Function [  ] Unintended Weight Loss [  ] Food & Nutrition Related Knowledge Deficit [  ] Malnutrition     Nutrition Diagnosis is [ ] ongoing  [ ] resolved [x ] not applicable     New Nutrition Diagnosis: [ ]       Interventions:   Recommend  [ x ] Continue current diet order  [  ] Change Diet To:  [  ] Nutrition Supplement  [  ] Nutrition Support  [  ] add MVI daily  [  ] add vit c 500 mg BID  [  ] labs requested  [  ] B12 [  ]folate  [  ] Ferritin/Fe  [  ] Nh3   [  ]Hba1c  [  ] Palliative care eval  [  ] new weight  [  ] SLP eval/re eval  [  ] Other:     Monitoring and Evaluation: Will monitor for changes/tolerance to diet prescription  [ x ] follow up per protocol  [ ] other:

## 2021-09-13 NOTE — PROGRESS NOTE ADULT - TIME BILLING
Patient seen and examined at bedside. Chart, meds, labs, vitals reviewed. Plan d/w team. Dc planning in progress
Patient seen and examined at bedside. Chart, meds, labs, vitals reviewed. Plan d/w team
I personally conducted a physical examination of the patient. I personally gathered the patient's history, chart, meds, labs, vitals.  I edited the above listed findings which were prepared by the listed resident physician. Plan d/w SW/CM, Consultants, Residents
care coordination, plan of care discussed with 1E IDR team

## 2021-09-13 NOTE — PROGRESS NOTE ADULT - PROBLEM SELECTOR PLAN 5
- C/w home ASA 81mg, plavix 75mg and simvastatin.  - Pt denies history of MI, stent placement.

## 2021-09-13 NOTE — PROGRESS NOTE ADULT - ASSESSMENT
1.Gangrene of the left 2nd-4th digits   2. Ischemic changes along the left lower extremity   3. Dry scaly skin along the dorsal aspect of the left foot   4. Thicken, dystropic, shorten dystrophic nails 1-5 b/l     Ordered MRI to r/o OM because WBC trending up     Clinically, patient appears to have peripheral arterial disease     Follow up with the vascular consultation and recommendations and studies     Podiatry team will continue to follow patient while in house

## 2021-09-14 ENCOUNTER — TRANSCRIPTION ENCOUNTER (OUTPATIENT)
Age: 86
End: 2021-09-14

## 2021-09-14 VITALS
HEART RATE: 71 BPM | DIASTOLIC BLOOD PRESSURE: 57 MMHG | RESPIRATION RATE: 18 BRPM | SYSTOLIC BLOOD PRESSURE: 128 MMHG | OXYGEN SATURATION: 93 %

## 2021-09-14 LAB
ANION GAP SERPL CALC-SCNC: 8 MMOL/L — SIGNIFICANT CHANGE UP (ref 5–17)
BUN SERPL-MCNC: 54 MG/DL — HIGH (ref 7–23)
CALCIUM SERPL-MCNC: 7.9 MG/DL — LOW (ref 8.5–10.1)
CHLORIDE SERPL-SCNC: 111 MMOL/L — HIGH (ref 96–108)
CO2 SERPL-SCNC: 22 MMOL/L — SIGNIFICANT CHANGE UP (ref 22–31)
CREAT SERPL-MCNC: 1.5 MG/DL — HIGH (ref 0.5–1.3)
GLUCOSE SERPL-MCNC: 101 MG/DL — HIGH (ref 70–99)
HCT VFR BLD CALC: 29.6 % — LOW (ref 34.5–45)
HGB BLD-MCNC: 9.6 G/DL — LOW (ref 11.5–15.5)
MCHC RBC-ENTMCNC: 28.6 PG — SIGNIFICANT CHANGE UP (ref 27–34)
MCHC RBC-ENTMCNC: 32.4 GM/DL — SIGNIFICANT CHANGE UP (ref 32–36)
MCV RBC AUTO: 88.1 FL — SIGNIFICANT CHANGE UP (ref 80–100)
NRBC # BLD: 0 /100 WBCS — SIGNIFICANT CHANGE UP (ref 0–0)
PLATELET # BLD AUTO: 363 K/UL — SIGNIFICANT CHANGE UP (ref 150–400)
POTASSIUM SERPL-MCNC: 3.8 MMOL/L — SIGNIFICANT CHANGE UP (ref 3.5–5.3)
POTASSIUM SERPL-SCNC: 3.8 MMOL/L — SIGNIFICANT CHANGE UP (ref 3.5–5.3)
RBC # BLD: 3.36 M/UL — LOW (ref 3.8–5.2)
RBC # FLD: 15.6 % — HIGH (ref 10.3–14.5)
SODIUM SERPL-SCNC: 141 MMOL/L — SIGNIFICANT CHANGE UP (ref 135–145)
WBC # BLD: 14.68 K/UL — HIGH (ref 3.8–10.5)
WBC # FLD AUTO: 14.68 K/UL — HIGH (ref 3.8–10.5)

## 2021-09-14 PROCEDURE — 97161 PT EVAL LOW COMPLEX 20 MIN: CPT

## 2021-09-14 PROCEDURE — 93923 UPR/LXTR ART STDY 3+ LVLS: CPT

## 2021-09-14 PROCEDURE — 84100 ASSAY OF PHOSPHORUS: CPT

## 2021-09-14 PROCEDURE — 83605 ASSAY OF LACTIC ACID: CPT

## 2021-09-14 PROCEDURE — U0005: CPT

## 2021-09-14 PROCEDURE — 87641 MR-STAPH DNA AMP PROBE: CPT

## 2021-09-14 PROCEDURE — 92610 EVALUATE SWALLOWING FUNCTION: CPT

## 2021-09-14 PROCEDURE — 92526 ORAL FUNCTION THERAPY: CPT

## 2021-09-14 PROCEDURE — 86140 C-REACTIVE PROTEIN: CPT

## 2021-09-14 PROCEDURE — 82746 ASSAY OF FOLIC ACID SERUM: CPT

## 2021-09-14 PROCEDURE — 99285 EMERGENCY DEPT VISIT HI MDM: CPT | Mod: 25

## 2021-09-14 PROCEDURE — 85045 AUTOMATED RETICULOCYTE COUNT: CPT

## 2021-09-14 PROCEDURE — 93926 LOWER EXTREMITY STUDY: CPT

## 2021-09-14 PROCEDURE — 87040 BLOOD CULTURE FOR BACTERIA: CPT

## 2021-09-14 PROCEDURE — 85025 COMPLETE CBC W/AUTO DIFF WBC: CPT

## 2021-09-14 PROCEDURE — 73630 X-RAY EXAM OF FOOT: CPT

## 2021-09-14 PROCEDURE — 36415 COLL VENOUS BLD VENIPUNCTURE: CPT

## 2021-09-14 PROCEDURE — 87635 SARS-COV-2 COVID-19 AMP PRB: CPT

## 2021-09-14 PROCEDURE — 85652 RBC SED RATE AUTOMATED: CPT

## 2021-09-14 PROCEDURE — 85027 COMPLETE CBC AUTOMATED: CPT

## 2021-09-14 PROCEDURE — 87077 CULTURE AEROBIC IDENTIFY: CPT

## 2021-09-14 PROCEDURE — 80053 COMPREHEN METABOLIC PANEL: CPT

## 2021-09-14 PROCEDURE — 85014 HEMATOCRIT: CPT

## 2021-09-14 PROCEDURE — 82607 VITAMIN B-12: CPT

## 2021-09-14 PROCEDURE — 99232 SBSQ HOSP IP/OBS MODERATE 35: CPT

## 2021-09-14 PROCEDURE — 83615 LACTATE (LD) (LDH) ENZYME: CPT

## 2021-09-14 PROCEDURE — 83036 HEMOGLOBIN GLYCOSYLATED A1C: CPT

## 2021-09-14 PROCEDURE — 85018 HEMOGLOBIN: CPT

## 2021-09-14 PROCEDURE — 87070 CULTURE OTHR SPECIMN AEROBIC: CPT

## 2021-09-14 PROCEDURE — U0003: CPT

## 2021-09-14 PROCEDURE — 83540 ASSAY OF IRON: CPT

## 2021-09-14 PROCEDURE — 93005 ELECTROCARDIOGRAM TRACING: CPT

## 2021-09-14 PROCEDURE — 83010 ASSAY OF HAPTOGLOBIN QUANT: CPT

## 2021-09-14 PROCEDURE — 87186 SC STD MICRODIL/AGAR DIL: CPT

## 2021-09-14 PROCEDURE — 83550 IRON BINDING TEST: CPT

## 2021-09-14 PROCEDURE — 99239 HOSP IP/OBS DSCHRG MGMT >30: CPT

## 2021-09-14 PROCEDURE — 97110 THERAPEUTIC EXERCISES: CPT

## 2021-09-14 PROCEDURE — 87640 STAPH A DNA AMP PROBE: CPT

## 2021-09-14 PROCEDURE — 86769 SARS-COV-2 COVID-19 ANTIBODY: CPT

## 2021-09-14 PROCEDURE — 96374 THER/PROPH/DIAG INJ IV PUSH: CPT

## 2021-09-14 PROCEDURE — 82728 ASSAY OF FERRITIN: CPT

## 2021-09-14 PROCEDURE — 71045 X-RAY EXAM CHEST 1 VIEW: CPT

## 2021-09-14 PROCEDURE — 83735 ASSAY OF MAGNESIUM: CPT

## 2021-09-14 PROCEDURE — 80048 BASIC METABOLIC PNL TOTAL CA: CPT

## 2021-09-14 PROCEDURE — 87075 CULTR BACTERIA EXCEPT BLOOD: CPT

## 2021-09-14 RX ORDER — CIPROFLOXACIN LACTATE 400MG/40ML
1 VIAL (ML) INTRAVENOUS
Qty: 14 | Refills: 0
Start: 2021-09-14 | End: 2021-09-20

## 2021-09-14 RX ORDER — ASPIRIN/CALCIUM CARB/MAGNESIUM 324 MG
1 TABLET ORAL
Qty: 15 | Refills: 0
Start: 2021-09-14 | End: 2021-09-28

## 2021-09-14 RX ORDER — FUROSEMIDE 40 MG
1 TABLET ORAL
Qty: 15 | Refills: 0
Start: 2021-09-14 | End: 2021-09-28

## 2021-09-14 RX ORDER — LACTOBACILLUS ACIDOPHILUS 100MM CELL
1 CAPSULE ORAL
Refills: 0 | Status: DISCONTINUED | OUTPATIENT
Start: 2021-09-14 | End: 2021-09-14

## 2021-09-14 RX ORDER — CEFPODOXIME PROXETIL 100 MG
100 TABLET ORAL EVERY 12 HOURS
Refills: 0 | Status: DISCONTINUED | OUTPATIENT
Start: 2021-09-14 | End: 2021-09-14

## 2021-09-14 RX ORDER — ACETAMINOPHEN 500 MG
2 TABLET ORAL
Qty: 120 | Refills: 0
Start: 2021-09-14 | End: 2021-09-28

## 2021-09-14 RX ORDER — ACETAMINOPHEN 500 MG
2 TABLET ORAL
Qty: 0 | Refills: 0 | DISCHARGE
Start: 2021-09-14

## 2021-09-14 RX ORDER — HYDRALAZINE HCL 50 MG
1 TABLET ORAL
Qty: 21 | Refills: 0
Start: 2021-09-14 | End: 2021-09-20

## 2021-09-14 RX ORDER — LEVOTHYROXINE SODIUM 125 MCG
1 TABLET ORAL
Qty: 15 | Refills: 0
Start: 2021-09-14 | End: 2021-09-28

## 2021-09-14 RX ORDER — CIPROFLOXACIN LACTATE 400MG/40ML
250 VIAL (ML) INTRAVENOUS EVERY 12 HOURS
Refills: 0 | Status: DISCONTINUED | OUTPATIENT
Start: 2021-09-14 | End: 2021-09-14

## 2021-09-14 RX ORDER — LOSARTAN POTASSIUM 100 MG/1
1 TABLET, FILM COATED ORAL
Qty: 0 | Refills: 0 | DISCHARGE
Start: 2021-09-14

## 2021-09-14 RX ORDER — LACTOBACILLUS ACIDOPHILUS 100MM CELL
1 CAPSULE ORAL
Qty: 21 | Refills: 0
Start: 2021-09-14 | End: 2021-09-20

## 2021-09-14 RX ORDER — LOSARTAN POTASSIUM 100 MG/1
1 TABLET, FILM COATED ORAL
Qty: 15 | Refills: 0
Start: 2021-09-14 | End: 2021-09-28

## 2021-09-14 RX ORDER — ATENOLOL 25 MG/1
1 TABLET ORAL
Qty: 15 | Refills: 0
Start: 2021-09-14 | End: 2021-09-28

## 2021-09-14 RX ORDER — CEFPODOXIME PROXETIL 100 MG
1 TABLET ORAL
Qty: 14 | Refills: 0
Start: 2021-09-14 | End: 2021-09-20

## 2021-09-14 RX ORDER — CLOPIDOGREL BISULFATE 75 MG/1
1 TABLET, FILM COATED ORAL
Qty: 15 | Refills: 0
Start: 2021-09-14 | End: 2021-09-28

## 2021-09-14 RX ORDER — SIMVASTATIN 20 MG/1
1 TABLET, FILM COATED ORAL
Qty: 15 | Refills: 0
Start: 2021-09-14 | End: 2021-09-28

## 2021-09-14 RX ADMIN — Medication 20 MILLIGRAM(S): at 05:11

## 2021-09-14 RX ADMIN — Medication 10 MILLIGRAM(S): at 05:12

## 2021-09-14 RX ADMIN — Medication 10 MILLIGRAM(S): at 14:40

## 2021-09-14 RX ADMIN — ATENOLOL 100 MILLIGRAM(S): 25 TABLET ORAL at 05:12

## 2021-09-14 RX ADMIN — CLOPIDOGREL BISULFATE 75 MILLIGRAM(S): 75 TABLET, FILM COATED ORAL at 12:21

## 2021-09-14 RX ADMIN — LOSARTAN POTASSIUM 100 MILLIGRAM(S): 100 TABLET, FILM COATED ORAL at 05:12

## 2021-09-14 RX ADMIN — Medication 25 MICROGRAM(S): at 05:12

## 2021-09-14 RX ADMIN — CEFEPIME 100 MILLIGRAM(S): 1 INJECTION, POWDER, FOR SOLUTION INTRAMUSCULAR; INTRAVENOUS at 07:10

## 2021-09-14 RX ADMIN — APIXABAN 2.5 MILLIGRAM(S): 2.5 TABLET, FILM COATED ORAL at 05:12

## 2021-09-14 RX ADMIN — Medication 81 MILLIGRAM(S): at 12:21

## 2021-09-14 RX ADMIN — Medication 650 MILLIGRAM(S): at 01:46

## 2021-09-14 RX ADMIN — Medication 1 TABLET(S): at 12:21

## 2021-09-14 NOTE — PROGRESS NOTE ADULT - PROBLEM SELECTOR PROBLEM 2
PAD (peripheral artery disease)

## 2021-09-14 NOTE — PROGRESS NOTE ADULT - PROBLEM SELECTOR PROBLEM 1
Wound of foot

## 2021-09-14 NOTE — DISCHARGE NOTE NURSING/CASE MANAGEMENT/SOCIAL WORK - PATIENT PORTAL LINK FT
You can access the FollowMyHealth Patient Portal offered by [facility] by registering at the following website: [email or web address]. By joining [employer]’s FollowMyHealth portal, you will also be able to view your health information using other applications (apps) compatible with our system.

## 2021-09-14 NOTE — PROGRESS NOTE ADULT - ASSESSMENT
1.Gangrene of the left 2nd-4th digits   2. Ischemic changes along the left lower extremity   3. Dry scaly skin along the dorsal aspect of the left foot   4. Thicken, dystropic, shorten dystrophic nails 1-5 b/l     Ordered MRI to r/o OM because WBC trending up     Clinically, patient appears to have peripheral arterial disease     Follow up with the vascular consultation and recommendations and studies     Podiatry team will continue to follow patient while in house      1.Gangrene of the left 2nd-4th digits   2. Ischemic changes along the left lower extremity   3. Dry scaly skin along the dorsal aspect of the left foot   4. Thicken, dystropic, shorten dystrophic nails 1-5 b/l     Ordered MRI to r/o OM because WBC trending up- please note: Spoke to patient's daughter and  daughter states that she will not want any surgical intervention for her mother. The MRI is cancelled. The patient daughter was educated and discuss the possible diagnosis of osteomyelitis, daughter is aware and will like to continue to local wound care conservatively. Wound Care Instructions below. Podiatry stable     Clinically, patient appears to have peripheral arterial disease     Wound Care Instructions below

## 2021-09-14 NOTE — PROGRESS NOTE ADULT - SUBJECTIVE AND OBJECTIVE BOX
[facility]  INFECTIOUS DISEASES   [address] [place] [address]  Tel: [phone]     Fax: [phone]  =======================================================    [id number]  NITA ZURITA     Follow up: Left foot infection     No new changes or overnight event. No fever. Mild leukocytosis.  Culture with proteus, pseudomonas, and enterobacter.   Pseudomonas and enterobacter are S to fluoroquinolones and proteus S to 3rd gen ceph.     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 3.375 Gram(s) IV Intermittent every 8 hours     REVIEW OF SYSTEMS:  CONSTITUTIONAL:  No Fever or chills  HEENT:  No diplopia or blurred vision.  No sore throat or runny nose.  CARDIOVASCULAR:  No chest pain or SOB.  RESPIRATORY:  No cough, shortness of breath, PND or orthopnea.  GASTROINTESTINAL:  No nausea, vomiting or diarrhea.  GENITOURINARY:  No dysuria, frequency or urgency. No Blood in urine  MUSCULOSKELETAL:  no joint aches, no muscle pain  SKIN:  foot ulcer and pain   PSYCHIATRIC:  No disorder of thought or mood.  ENDOCRINE:  No heat or cold intolerance, polyuria or polydipsia.  HEMATOLOGICAL:  No easy bruising or bleeding.     Physical Exam:  Vital Signs Last 24 Hrs  T(C): 36.7 (14 Sep 2021 05:26), Max: 37.6 (13 Sep 2021 20:34)  T(F): 98 (14 Sep 2021 05:26), Max: 99.6 (13 Sep 2021 20:34)  HR: 70 (14 Sep 2021 05:26) (68 - 71)  BP: 127/63 (14 Sep 2021 05:26) (117/68 - 138/60)  BP(mean): --  RR: 18 (14 Sep 2021 05:26) (18 - 18)  SpO2: 92% (14 Sep 2021 05:26) (92% - 94%)  GEN: NAD  HEENT: normocephalic and atraumatic. EOMI. PERRL.    NECK: Supple.  No lymphadenopathy   LUNGS: Clear to auscultation.  HEART: Regular rate and rhythm   ABDOMEN: Soft, nontender, and nondistended.  Positive bowel sounds.    : No CVA tenderness  EXTREMITIES: Lower extremities with multiple crusted ulcers in lower legs, chronic skin changes, left foot with hammer toes in 2nd and 3rd toes with ischemic changes, between toes from 1 to 3rd there are some pus discharge that was cleaned   NEUROLOGIC: grossly intact.  PSYCHIATRIC: Appropriate affect .  SKIN: No rash    Labs:                        9.6    14.68 )-----------( 363      ( 14 Sep 2021 08:43 )             29.6     09-14    141  |  111<H>  |  54<H>  ----------------------------<  101<H>  3.8   |  22  |  1.50<H>    Ca    7.9<L>      14 Sep 2021 08:43    TPro  6.1  /  Alb  1.8<L>  /  TBili  0.5  /  DBili  x   /  AST  16  /  ALT  17  /  AlkPhos  52  09-13    Culture - Abscess with Gram Stain (collected 09-07-21 @ 15:29)  Source: .Abscess left foot  Final Report (09-12-21 @ 09:03):    Numerous Enterobacter cloacae complex    Numerous Pseudomonas aeruginosa    Numerous Proteus mirabilis    Normal skin joseline isolated  Organism: Enterobacter cloacae complex  Pseudomonas aeruginosa  Proteus mirabilis (09-12-21 @ 09:03)  Organism: Proteus mirabilis (09-12-21 @ 09:03)    Sensitivities:      -  Amikacin: S <=16      -  Amoxicillin/Clavulanic Acid: S <=8/4      -  Ampicillin: R >16 These ampicillin results predict results for amoxicillin      -  Ampicillin/Sulbactam: R >16/8 Enterobacter, Citrobacter, and Serratia may develop resistance during prolonged therapy (3-4 days)      -  Aztreonam: S <=4      -  Cefazolin: R >16 Enterobacter, Citrobacter, and Serratia may develop resistance during prolonged therapy (3-4 days)      -  Cefepime: S <=2      -  Cefoxitin: S <=8      -  Ceftriaxone: S <=1 Enterobacter, Citrobacter, and Serratia may develop resistance during prolonged therapy      -  Ciprofloxacin: R 2      -  Ertapenem: S <=0.5      -  Gentamicin: S 4      -  Levofloxacin: R 4      -  Meropenem: S <=1      -  Piperacillin/Tazobactam: S <=8      -  Tobramycin: S <=2      -  Trimethoprim/Sulfamethoxazole: R >2/38      Method Type: JOVANNA  Organism: Pseudomonas aeruginosa (09-12-21 @ 09:03)    Sensitivities:      -  Amikacin: S <=16      -  Aztreonam: S <=4      -  Cefepime: S <=2      -  Ceftazidime: S <=1      -  Ciprofloxacin: S <=0.25      -  Gentamicin: S <=2      -  Imipenem: S <=1      -  Levofloxacin: S <=0.5      -  Meropenem: S <=1      -  Piperacillin/Tazobactam: S <=8      -  Tobramycin: S <=2      Method Type: JOVANNA  Organism: Enterobacter cloacae complex (09-12-21 @ 09:03)    Sensitivities:      -  Amikacin: S <=16      -  Amoxicillin/Clavulanic Acid: R 16/8      -  Ampicillin: R <=8 These ampicillin results predict results for amoxicillin      -  Ampicillin/Sulbactam: R <=4/2 Enterobacter, Citrobacter, and Serratia may develop resistance during prolonged therapy (3-4 days)      -  Aztreonam: S <=4      -  Cefazolin: R >16 Enterobacter, Citrobacter, and Serratia may develop resistance during prolonged therapy (3-4 days)      -  Cefepime: S <=2      -  Cefoxitin: R <=8      -  Ceftriaxone: S <=1 Enterobacter, Citrobacter, and Serratia may develop resistance during prolonged therapy      -  Ciprofloxacin: S <=0.25      -  Ertapenem: S <=0.5      -  Gentamicin: S <=2      -  Imipenem: S <=1      -  Levofloxacin: S <=0.5      -  Meropenem: S <=1      -  Piperacillin/Tazobactam: S <=8      -  Tobramycin: S <=2      -  Trimethoprim/Sulfamethoxazole: S <=0.5/9.5      Method Type: JOVANNA    Culture - Other (collected 09-07-21 @ 03:16)  Source: .Other left foot wound  Final Report (09-11-21 @ 13:24):    Numerous Pseudomonas aeruginosa    Moderate Proteus mirabilis  Organism: Pseudomonas aeruginosa  Proteus mirabilis (09-11-21 @ 13:24)  Organism: Proteus mirabilis (09-11-21 @ 13:24)    Sensitivities:      -  Amikacin: S <=16      -  Amoxicillin/Clavulanic Acid: S <=8/4      -  Ampicillin: R >16 These ampicillin results predict results for amoxicillin      -  Ampicillin/Sulbactam: R >16/8 Enterobacter, Citrobacter, and Serratia may develop resistance during prolonged therapy (3-4 days)      -  Aztreonam: S <=4      -  Cefazolin: R >16 Enterobacter, Citrobacter, and Serratia may develop resistance during prolonged therapy (3-4 days)      -  Cefepime: S <=2      -  Cefoxitin: S <=8      -  Ceftriaxone: S <=1 Enterobacter, Citrobacter, and Serratia may develop resistance during prolonged therapy      -  Ciprofloxacin: R 2      -  Ertapenem: S <=0.5      -  Gentamicin: S <=2      -  Levofloxacin: R 4      -  Meropenem: S <=1      -  Piperacillin/Tazobactam: S <=8      -  Tobramycin: S <=2      -  Trimethoprim/Sulfamethoxazole: R >2/38      Method Type: JOVANNA  Organism: Pseudomonas aeruginosa (09-11-21 @ 13:24)    Sensitivities:      -  Amikacin: S <=16      -  Aztreonam: S <=4      -  Cefepime: S <=2      -  Ceftazidime: S <=1      -  Ciprofloxacin: S <=0.25      -  Gentamicin: S <=2      -  Imipenem: S <=1      -  Levofloxacin: S <=0.5      -  Meropenem: S <=1      -  Piperacillin/Tazobactam: S <=8      -  Tobramycin: S <=2      Method Type: JOVANNA    Culture - Blood (collected 09-07-21 @ 01:45)  Source: .Blood Blood  Final Report (09-12-21 @ 06:10):    No Growth Final    Culture - Blood (collected 09-07-21 @ 01:43)  Source: .Blood Blood  Final Report (09-12-21 @ 06:10):    No Growth Final    WBC Count: 14.68 K/uL (09-14-21 @ 08:43)  WBC Count: 13.49 K/uL (09-13-21 @ 08:04)  WBC Count: 12.90 K/uL (09-12-21 @ 08:10)  WBC Count: 13.46 K/uL (09-11-21 @ 09:16)  WBC Count: 12.22 K/uL (09-10-21 @ 08:30)    Creatinine, Serum: 1.50 mg/dL (09-14-21 @ 08:43)  Creatinine, Serum: 1.30 mg/dL (09-13-21 @ 08:04)  Creatinine, Serum: 1.00 mg/dL (09-12-21 @ 08:10)  Creatinine, Serum: 1.10 mg/dL (09-11-21 @ 09:16)  Creatinine, Serum: 1.20 mg/dL (09-10-21 @ 08:30)    C-Reactive Protein, Serum: 21 mg/L (09-07-21 @ 00:16)    Ferritin, Serum: 82 ng/mL (09-07-21 @ 12:17)    COVID-19 PCR: NotDetec (09-13-21 @ 05:44)  COVID-19 PCR: NotDetec (09-06-21 @ 15:15)    All imaging and other data have been reviewed.  < from: Xray Foot AP + Lateral + Oblique, Left (09.06.21 @ 14:48) >  IMPRESSION: No acute or destructive osseous pathology. No plain film evidence of osteomyelitis.      Assessment and Plan:   89y Female, from [facility], with PMH of HTN, CAD, PAD, hypothyroidism, chronic wounds, was admitted with left foot wound.   Chronic skin changes with areas of open wounds and some pus discharge between toes, possibly main problem is the ischemia, for superficial ulcer can treat for possible cellulitis,   I wouldn't be very aggressive at this time.   I don't believe it is appropriate to put PICC or keep her on long course ABx at this time.     1- Left foot wound and infection with ischemia    Recommendations:   - Blood culture NGTD  - Wound culture with pseudomonas, enterobacter and proteus S to cefepime  - Xray neg for OM, but she has ischemic toes   - Vascular and podiatry not planning for any surgical intervention.    - Can switch to oral Cipro 250mg q12 and vantin 100mg q12 to complete 2 weeks total  - Needs wound care follow up.     Will follow PRN.   D/W Dr. Gonzales.     Janet Gonzalez MD  Division of Infectious Diseases   Cell [phone] between 8am and 6pm   After 6pm and weekends please call ID service at [phone].

## 2021-09-14 NOTE — PROGRESS NOTE ADULT - PROBLEM SELECTOR PLAN 1
- Patient seen and evaluated   - Wounds dressed with Aquacel ag and dsd  - Xrays appreciated above  - Possible MRI of the left foot to r/o OM  - Follow up with the vascular consultation and recommendations   - Podiatry team will continue to follow patient while in house  - wIll contact patient's daughter about treatment plan  - Wound Care Instructions below  WOUND CARE INSTRUCTIONS   1. Cleanse wound with sterile saline solution and gently pat dry.  2. Dress wound with Aquacel Ag and dry, sterile dressing.  3. Change dressings every 2 days and monitor for any signs of infection.  4. Patient is to follow up [facility] with Dr. Irizarry or Dr. Zheng within 1 week upon discharge. - Patient seen and evaluated   - Wounds dressed with Aquacel ag and dsd  - Xrays appreciated above  - Per patient's daughter, continue with conservative wound care and no MRI or surgical intervention  - Follow up with the vascular recommendations   - Podiatry team will continue to follow patient while in house  - Wound Care Instructions below  WOUND CARE INSTRUCTIONS   1. Cleanse wound with sterile saline solution and gently pat dry.  2. Dress wound with Aquacel Ag and dry, sterile dressing.  3. Change dressings every 2 days and monitor for any signs of infection.  4. Patient is to follow up in the [facility] with Dr. Irizarry or Dr. Zheng within 1 week upon discharge.

## 2021-09-14 NOTE — PROGRESS NOTE ADULT - REASON FOR ADMISSION
L foot wound

## 2021-09-14 NOTE — PROGRESS NOTE ADULT - SUBJECTIVE AND OBJECTIVE BOX
89y year old Female seen at [facility] 1EAS 104 D1 for ischemic changes to the left lower extremity and early gangrenous changes to the toes 1-5, particularly 2-3 on the left. The patient can not recall how long she has the wounds on her wounds on her left foot but states that her daughter knows. The patient states that she has been following up at the wound care center at [facility] with Dr. Smith. The patient is unable to recall what they have been doing to her wounds there. The patient also mentions that she had  an appointment today with Dr. Smith. The patient states that her left foot and leg is extremely sensitive to the touch along the LLE. The patient is currently residing at a nursing home. Denies any fever, chills, nausea, vomiting, chest pain, shortness of breath, or calf pain at this time.  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 3.375 Gram(s) IV Intermittent every 8 hours senna 2 Tablet(s) Oral at bedtime simvastatin 40 milliGRAM(s) Oral at bedtime  MEDICATIONS  (PRN): acetaminophen   Tablet .. 650 milliGRAM(s) Oral every 6 hours PRN Temp greater or equal to 38C (100.4F), Mild Pain (1 - 3) melatonin 3 milliGRAM(s) Oral at bedtime PRN Insomnia   Social History: Former smoker, "didn't inhale", from ages [age]  Denies EtOH Denies recreational drugs  Non-ambulatory, uses wheelchair Requires assistance with ADLs from [facility] (06 Sep 2021 19:12)   FAMILY HISTORY: Family history of breast cancer in mother (Mother)    Vital Signs Last 24 Hrs T(C): 36.7 (14 Sep 2021 05:26), Max: 37.6 (13 Sep 2021 20:34) T(F): 98 (14 Sep 2021 05:26), Max: 99.6 (13 Sep 2021 20:34) HR: 70 (14 Sep 2021 05:26) (68 - 71) BP: 127/63 (14 Sep 2021 05:26) (117/68 - 138/60) BP(mean): -- RR: 18 (14 Sep 2021 05:26) (18 - 18) SpO2: 92% (14 Sep 2021 05:26) (92% - 94%)  PHYSICAL EXAM: Vascular: DP & PT nonpalpable bilaterally, Capillary refill less than 4 or 5 seconds, No Digital hair present bilaterally, Diffuse erythema along the left lower extremity , skin temperature colder than within normal limit  Neurological: Gross epicritic sensation b/l  Musculoskeletal: 5/5 strength in all quadrants bilaterally, AJ & STJ ROM intact, pain and tenderness as well as sensitivity along the left lower extremity  Dermatological:  1.Gangrene of the left 2nd-4th digits  2. Ischemic changes along the left lower extremity  3. Dry scaly skin along the dorsal aspect of the left foot  4. Thicken, dystropic, shorten dystrophic nails 1-5 b/l   CBC Full  -  ( 14 Sep 2021 08:43 ) WBC Count : 14.68 K/uL RBC Count : 3.36 M/uL Hemoglobin : 9.6 g/dL Hematocrit : 29.6 % Platelet Count - Automated : 363 K/uL Mean Cell Volume : 88.1 fl Mean Cell Hemoglobin : 28.6 pg Mean Cell Hemoglobin Concentration : 32.4 gm/dL Auto Neutrophil # : x Auto Lymphocyte # : x Auto Monocyte # : x Auto Eosinophil # : x Auto Basophil # : x Auto Neutrophil % : x Auto Lymphocyte % : x Auto Monocyte % : x Auto Eosinophil % : x Auto Basophil % : x    ----------CHEM PANEL----------  09-14  141  |  111<H>  |  54<H> ----------------------------<  101<H> 3.8   |  22  |  1.50<H>  Ca    7.9<L>      14 Sep 2021 08:43  TPro  6.1  /  Alb  1.8<L>  /  TBili  0.5  /  DBili  x   /  AST  16  /  ALT  17  /  AlkPhos  52  09-13       Imaging:  EXAM: XR FOOT COMP MIN 3 VIEWS LT   PROCEDURE DATE: 09/06/2021    INTERPRETATION: Ulcer left foot.  3 views left foot.  Advanced diffuse senile demineralization. Multiple hammertoe deformities. No fracture dislocation focal bone lysis or unusual periosteal reaction. Faint small vessel calcification. No soft tissue gas. Mild diffuse soft tissue edema  IMPRESSION: No acute or destructive osseous pathology. No plain film evidence of osteomyelitis.  --- End of Report ---      CONNOR CEDILLO MD; Attending Radiologist This document has been electronically signed. Sep 6 2021 2:54PM Notes  	      89y year old Female seen at [facility] 1EAS 104 D1 for ischemic changes to the left lower extremity and early gangrenous changes to the toes 1-5, particularly 2-3 on the left. The patient can not recall how long she has the wounds on her wounds on her left foot but states that her daughter knows. The patient states that she has been following up at the wound care center at [facility] with Dr. Smith. The patient is unable to recall what they have been doing to her wounds there. The patient also mentions that she had  an appointment today with Dr. Smith. The patient states that her left foot and leg is extremely sensitive to the touch along the LLE. The patient is currently residing at a nursing home. Denies any fever, chills, nausea, vomiting, chest pain, shortness of breath, or calf pain at this time.  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 3.375 Gram(s) IV Intermittent every 8 hours senna 2 Tablet(s) Oral at bedtime simvastatin 40 milliGRAM(s) Oral at bedtime  MEDICATIONS  (PRN): acetaminophen   Tablet .. 650 milliGRAM(s) Oral every 6 hours PRN Temp greater or equal to 38C (100.4F), Mild Pain (1 - 3) melatonin 3 milliGRAM(s) Oral at bedtime PRN Insomnia   Social History: Former smoker, "didn't inhale", from ages [age]  Denies EtOH Denies recreational drugs  Non-ambulatory, uses wheelchair Requires assistance with ADLs from [facility] (06 Sep 2021 19:12)   FAMILY HISTORY: Family history of breast cancer in mother (Mother)    Vital Signs Last 24 Hrs T(C): 36.7 (14 Sep 2021 14:00), Max: 37.6 (13 Sep 2021 20:34) T(F): 98 (14 Sep 2021 14:00), Max: 99.6 (13 Sep 2021 20:34) HR: 71 (14 Sep 2021 14:36) (70 - 71) BP: 128/57 (14 Sep 2021 14:36) (116/56 - 128/57) BP(mean): -- RR: 18 (14 Sep 2021 14:36) (17 - 18) SpO2: 93% (14 Sep 2021 14:36) (92% - 94%)  PHYSICAL EXAM: Vascular: DP & PT nonpalpable bilaterally, Capillary refill less than 4 or 5 seconds, No Digital hair present bilaterally, Diffuse erythema along the left lower extremity , skin temperature colder than within normal limit  Neurological: Gross epicritic sensation b/l  Musculoskeletal: 5/5 strength in all quadrants bilaterally, AJ & STJ ROM intact, pain and tenderness as well as sensitivity along the left lower extremity  Dermatological:  1.Gangrene of the left 2nd-4th digits  2. Ischemic changes along the left lower extremity  3. Dry scaly skin along the dorsal aspect of the left foot  4. Thicken, dystropic, shorten dystrophic nails 1-5 b/l   CBC Full  -  ( 14 Sep 2021 08:43 ) WBC Count : 14.68 K/uL RBC Count : 3.36 M/uL Hemoglobin : 9.6 g/dL Hematocrit : 29.6 % Platelet Count - Automated : 363 K/uL Mean Cell Volume : 88.1 fl Mean Cell Hemoglobin : 28.6 pg Mean Cell Hemoglobin Concentration : 32.4 gm/dL Auto Neutrophil # : x Auto Lymphocyte # : x Auto Monocyte # : x Auto Eosinophil # : x Auto Basophil # : x Auto Neutrophil % : x Auto Lymphocyte % : x Auto Monocyte % : x Auto Eosinophil % : x Auto Basophil % : x    ----------CHEM PANEL----------  09-14  141  |  111<H>  |  54<H> ----------------------------<  101<H> 3.8   |  22  |  1.50<H>  Ca    7.9<L>      14 Sep 2021 08:43  TPro  6.1  /  Alb  1.8<L>  /  TBili  0.5  /  DBili  x   /  AST  16  /  ALT  17  /  AlkPhos  52  09-13        Imaging:  EXAM: XR FOOT COMP MIN 3 VIEWS LT   PROCEDURE DATE: 09/06/2021    INTERPRETATION: Ulcer left foot.  3 views left foot.  Advanced diffuse senile demineralization. Multiple hammertoe deformities. No fracture dislocation focal bone lysis or unusual periosteal reaction. Faint small vessel calcification. No soft tissue gas. Mild diffuse soft tissue edema  IMPRESSION: No acute or destructive osseous pathology. No plain film evidence of osteomyelitis.  --- End of Report ---      CONNOR CEDILLO MD; Attending Radiologist This document has been electronically signed. Sep 6 2021 2:54PM Notes

## 2021-09-14 NOTE — PROGRESS NOTE ADULT - PROVIDER SPECIALTY LIST ADULT
Infectious Disease
Vascular Surgery
Infectious Disease
Internal Medicine
Podiatry
Podiatry
Vascular Surgery
Podiatry
Hospitalist
Internal Medicine

## 2023-09-26 RX ORDER — CLOPIDOGREL BISULFATE 75 MG/1
1 TABLET, FILM COATED ORAL
Qty: 0 | Refills: 0 | DISCHARGE

## 2023-09-26 RX ORDER — LEVOTHYROXINE SODIUM 125 MCG
1 TABLET ORAL
Qty: 0 | Refills: 0 | DISCHARGE

## 2023-09-26 RX ORDER — ATENOLOL 25 MG/1
1 TABLET ORAL
Qty: 0 | Refills: 0 | DISCHARGE

## 2023-09-26 RX ORDER — SIMVASTATIN 20 MG/1
0 TABLET, FILM COATED ORAL
Qty: 0 | Refills: 0 | DISCHARGE

## 2023-09-26 RX ORDER — CLOPIDOGREL BISULFATE 75 MG/1
0 TABLET, FILM COATED ORAL
Qty: 0 | Refills: 0 | DISCHARGE

## 2023-09-26 RX ORDER — FUROSEMIDE 40 MG
0 TABLET ORAL
Qty: 0 | Refills: 0 | DISCHARGE

## 2023-09-26 RX ORDER — SIMVASTATIN 20 MG/1
1 TABLET, FILM COATED ORAL
Qty: 0 | Refills: 0 | DISCHARGE

## 2023-09-26 RX ORDER — ATENOLOL 25 MG/1
0 TABLET ORAL
Qty: 0 | Refills: 0 | DISCHARGE

## 2023-09-26 RX ORDER — LEVOTHYROXINE SODIUM 125 MCG
0 TABLET ORAL
Qty: 0 | Refills: 0 | DISCHARGE

## 2023-09-26 RX ORDER — FUROSEMIDE 40 MG
1 TABLET ORAL
Qty: 0 | Refills: 0 | DISCHARGE

## 2023-09-26 RX ORDER — LOSARTAN POTASSIUM 100 MG/1
0 TABLET, FILM COATED ORAL
Qty: 0 | Refills: 0 | DISCHARGE

## 2023-09-26 RX ORDER — ASPIRIN/CALCIUM CARB/MAGNESIUM 324 MG
1 TABLET ORAL
Qty: 0 | Refills: 0 | DISCHARGE
